# Patient Record
Sex: FEMALE | Race: WHITE | NOT HISPANIC OR LATINO | Employment: UNEMPLOYED | URBAN - METROPOLITAN AREA
[De-identification: names, ages, dates, MRNs, and addresses within clinical notes are randomized per-mention and may not be internally consistent; named-entity substitution may affect disease eponyms.]

---

## 2017-03-10 ENCOUNTER — HOSPITAL ENCOUNTER (OUTPATIENT)
Dept: RADIOLOGY | Facility: CLINIC | Age: 52
Discharge: HOME | End: 2017-03-10
Payer: COMMERCIAL

## 2017-03-10 ENCOUNTER — ALLSCRIPTS OFFICE VISIT (OUTPATIENT)
Dept: OTHER | Facility: OTHER | Age: 52
End: 2017-03-10

## 2017-03-10 DIAGNOSIS — M25.511 PAIN IN RIGHT SHOULDER: ICD-10-CM

## 2017-03-10 PROCEDURE — 73030 X-RAY EXAM OF SHOULDER: CPT

## 2017-09-25 ENCOUNTER — GENERIC CONVERSION - ENCOUNTER (OUTPATIENT)
Dept: OTHER | Facility: OTHER | Age: 52
End: 2017-09-25

## 2017-09-25 LAB
AMBIG ABBREV LP DEFAULT (HISTORICAL): NORMAL
CHOLEST SERPL-MCNC: 204 MG/DL (ref 100–199)
HDLC SERPL-MCNC: 71 MG/DL
LDLC SERPL CALC-MCNC: 113 MG/DL (ref 0–99)
TRIGL SERPL-MCNC: 100 MG/DL (ref 0–149)
VLDLC SERPL CALC-MCNC: 20 MG/DL (ref 5–40)

## 2018-01-11 NOTE — RESULT NOTES
Verified Results  (1) CBC/PLT/DIFF [id number] 10:39AM Pallavinika Hutton     Test Name Result Flag Reference   WBC 5 0 x10E3/uL  3 4-10 8   RBC 4 29 x10E6/uL  3 77-5 28   Hemoglobin 10 7 g/dL L 11 1-15 9   Hematocrit 34 9 %  34 0-46  6   MCV 81 fL  79-97   MCH 24 9 pg L 26 6-33 0   MCHC 30 7 g/dL L 31 5-35 7   RDW 16 8 % H 12 3-15 4   Platelets 067 P89C0/NX  150-379   Neutrophils 65 %     Lymphs 26 %     Monocytes 7 %     Eos 1 %     Basos 1 %     Neutrophils (Absolute) 3 2 x10E3/uL  1 4-7 0   Lymphs (Absolute) 1 3 x10E3/uL  0 7-3 1   Monocytes(Absolute) 0 4 x10E3/uL  0 1-0 9   Eos (Absolute) 0 0 x10E3/uL  0 0-0 4   Baso (Absolute) 0 1 x10E3/uL  0 0-0 2   Immature Granulocytes 0 %     Immature Grans (Abs) 0 0 x10E3/uL  0 0-0 1     (1) COMPREHENSIVE METABOLIC PANEL [id number] 74:43LQ Alicia Lew     Test Name Result Flag Reference   Glucose, Serum 93 mg/dL  65-99   BUN 12 mg/dL  6-24   Creatinine, Serum 0 66 mg/dL  0 57-1 00   eGFR If NonAfricn Am 103 mL/min/1 73  >59   eGFR If Africn Am 118 mL/min/1 73  >59   BUN/Creatinine Ratio 18  9-23   Sodium, Serum 141 mmol/L  134-144   Potassium, Serum 4 2 mmol/L  3 5-5 2   Chloride, Serum 104 mmol/L     Carbon Dioxide, Total 20 mmol/L  18-29   Calcium, Serum 8 9 mg/dL  8 7-10 2   Protein, Total, Serum 7 2 g/dL  6 0-8 5   Albumin, Serum 4 3 g/dL  3 5-5 5   Globulin, Total 2 9 g/dL  1 5-4 5   A/G Ratio 1 5  1 1-2 5   Bilirubin, Total 0 4 mg/dL  0 0-1 2   Alkaline Phosphatase, S 62 IU/L     AST (SGOT) 16 IU/L  0-40   ALT (SGPT) 11 IU/L  0-32     (1) AMYLASE [id number] 10:39AM Alicia Lew     Test Name Result Flag Reference   Amylase, Serum 26 U/L L      (1) LIPASE [id number] 10:39AM Alicia Lew     Test Name Result Flag Reference   Lipase, Serum 30 U/L  0-59     (LC) TSH reflex to T4F 26Sep2016 10:39AM Alicia Lew     Test Name Result Flag Reference   TSH 0 198 uIU/mL L 0 450-4 500       Plan  Hypothyroidism    · From  Synthroid 150 MCG Oral Tablet To Levothyroxine Sodium 137 MCG  Oral Tablet (Synthroid) TAKE ONE TABLET BY MOUTH EVERY DAY AS DIRECTED   · (1) TSH WITH FT4 REFLEX; Status:Active;  Requested for:09Nov2016;

## 2018-01-15 NOTE — PROGRESS NOTES
Assessment    1  Right shoulder pain (719 41) (M25 511)   2  Hand numbness (782 0) (R20 0)    Plan  Right shoulder pain    · * XR SHOULDER 2+ VIEW RIGHT; Status:Resulted - Requires Verification,Retrospective  By Protocol Authorization;   Done: [id number] 10:03AM   · EMG ONE EXTREMITY WITH OR W/O RELATED PARASPINAL AREAS; Status:Active -  Retrospective By Protocol Authorization; Requested for:10Mar2017;   ONE : Maximiliano Haney has an unusual mix of symptoms  We will start with an EMG of the right upper extremity to evaluate for carpal and cubital tunnel syndrome versus cervical radiculopathy  We will see her back after this study has been done and read to discuss the results and how we will proceed  Discussion/Summary  The patient was counseled regarding instructions for management, prognosis, patient and family education, impressions, importance of compliance with treatment  Chief Complaint    1  Elbow Pain   2  Hand Problem   3  Shoulder Pain    History of Present Illness    Rimma Lopez is a 46year old female who presents today for evaluation of her right upper extremity  She first notes pain about the right shoulder  Her pain is described as mostly periscapular, though she can have some anterior pain as well at times  SHe states that this is fairly constant and she can not identify any alleviation or exacerbating factors  She notes full ROM of the shoulder and good strength  She also complains of some ulnar sided elbow pain, which feels like she "hit her funny bone"  She also notes some paresthesias about the radial 3 digits of her right hand  She rarely gets any symptoms into the little and ring fingers  She denies any real pain about the neck and notes good ROM of this  All these symptoms started a couple months ago with no inciting event prior to the onset of her symptoms  Review of Systems    Constitutional: No fever, no chills, feels well, no tiredness, no recent weight gain or loss     Eyes: No complaints of eyesight problems, no red eyes  ENT: no loss of hearing, no nosebleeds, no sore throat  Cardiovascular: No complaints of chest pain, no palpitations, no leg claudication or lower extremity edema  Respiratory: no compliants of shortness of breath, no wheezing, no cough  Gastrointestinal: no complaints of abdominal pain, no constipation, no nausea or diarrhea, no vomiting, no bloody stools  Genitourinary: no complaints of dysuria, no incontinence  Musculoskeletal: as noted in HPI  Integumentary: no complaints of skin rash or lesion, no itching or dry skin, no skin wounds  Neurological: as noted in HPI  Endocrine: No complaints of muscle weakness, no feelings of weakness, no frequent urination, no excessive thirst    Psychiatric: No suicidal thoughts, no anxiety, no feelings of depression  ROS reviewed  Active Problems    1  Abdominal discomfort (789 00) (R10 9)   2  Acute bronchitis (466 0) (J20 9)   3  Acute esophagitis (530 12) (K20 9)   4  Anemia (285 9) (D64 9)   5  Chest pain (786 50) (R07 9)   6  Encounter for screening for malignant neoplasm of colon (V76 51) (Z12 11)   7  Encounter for screening mammogram for breast cancer (V76 12) (Z12 31)   8  Herniated Intervertebral Disc (722 2)   9  Hyperlipidemia (272 4) (E78 5)   10  Hypothyroidism (244 9) (E03 9)   11  Right shoulder pain (719 41) (M25 511)    Past Medical History    · History of malignant neoplasm of thyroid (V10 87) (Z85 850)   · History of thyroid disease (V12 29) (Z86 39)    The active problems and past medical history were reviewed and updated today  Surgical History    · History of Salpingectomy   · History of Thyroid Surgery Total Thyroidectomy    The surgical history was reviewed and updated today  Family History  Mother    · No pertinent family history  Father    · No pertinent family history    The family history was reviewed and updated today         Social History    · Never A Smoker   · Occasional alcohol use  The social history was reviewed and updated today  Current Meds   1  Levothyroxine Sodium 137 MCG Oral Tablet; TAKE ONE TABLET BY MOUTH EVERY   DAY AS DIRECTED; Therapy: [id number] to (53 [phone] 09 76)  Requested for: [id number]; Last   Rx:03Jan2017 Ordered   2  Levothyroxine Sodium 150 MCG Oral Tablet; TAKE 1 TABLET DAILY; Therapy: 23RED7018 to  Requested for: 97Zls3585 Recorded    The medication list was reviewed and updated today  Allergies    1  No Known Drug Allergies    Vitals   Recorded: 00IUS2135 10:09AM   Heart Rate 79   Systolic 918   Diastolic 73   Height 5 ft 4 in   Weight 125 lb    BMI Calculated 21 46   BSA Calculated 1 6     Physical Exam    Cervical Spine: Appearance: Normal  Tenderness: None  ROM: Full  Motor: Normal  Upper Extremity Motor Testing: Strength examination: wrist strength was normal bilaterally  Wrist flexion was 5/5 on the right side and 5/5 on the left side  Wrist extension was 5/5 on the right side and 5/5 on the left side  elbow strength was normal bilaterally  Biceps strength was 5/5 on the right side and 5/5 on the left side  Triceps strength was 5/5 on the right side and 5/5 on the left side  Shoulder abduction was 5/5 on the right side and 5/5 on the left side  Special Tests: negative Lt Spurling's Maneuver and negative Rt Spurling's Maneuver  Right Shoulder: Appearance: Normal  Tenderness: None  ROM: Full except as noted: Motor: Normal 5/5 forward flexion, 5/5 abduction, 5/5 internal rotation and 5/5 external rotation  Forward flexion: painless  Abduction: painless  Internal rotation: painless  External rotation: painless  Special Tests: negative Painful Arc, negative Fox test, negative Neer test, negative Drop Arm test, negative Empty Can test, negative Lift Off test, negative Belly Press test, negative Parrish's test and negative Speed's test    Right Elbow: Appearance: Normal  Tenderness: None except the cubital tunnel  ROM: Full  Motor: Normal  Special Tests: positive Tinel's at the ulnar nerve  Right Wrist: Appearance: Normal  Tenderness: None  ROM: Full  Motor: Normal  Special Tests: negative Phalen's test and negative Tinel's sign at the carpal tunnel  Constitutional - General appearance: Normal    Musculoskeletal - Gait and station: Normal  Digits and nails: Normal  Muscle strength/tone: Normal  Upper extremity compartments: Normal    Cardiovascular - Pulses: Normal  Examination of extremities for edema and/or varicosities: Normal    Lymphatic - Palpation of lymph nodes in other areas: Normal  no right axillary node enlargement and no right epitrochlear node enlargement  Skin - Skin and subcutaneous tissue: Normal    Neurologic - Sensation: Normal  Upper extremity peripheral neuro exam: Normal    Psychiatric - Orientation to person, place, and time: Normal  Mood and affect: Normal    Eyes   Conjunctiva and lids: Normal     Pupils and irises: Normal        Results/Data  * XR SHOULDER 2+ VIEW RIGHT [id number] 10:03AM Claudy Wilkesron Order Number: [id number]     Test Name Result Flag Reference   XR SHOULDER 2+ VW RIGHT (Report)     RIGHT SHOULDER     INDICATION: 51-year-old female, right shoulder pain   COMPARISON: None     VIEWS: 3     IMAGES: 3     FINDINGS:     There is no acute fracture or dislocation  No degenerative changes  No lytic or blastic lesions are seen  Soft tissues are unremarkable  IMPRESSION:     No acute osseous abnormality  Workstation performed: [id number]     Signed by:   Sharyn Gleason MD   3/10/17                               Views: complete shoulder series of the right shoulder     Findings: no fracture, no dislocation, no bony lesions and the soft tissues were normal       Attending Note  Collaborating Physician Note: Collaborating Physician: I interviewed and examined the patient, I discussed the case with the Advanced Practitioner and reviewed the note, I supervised the Advanced Practitioner and I agree with the Advanced Practitioner note  Agree with Advanced Practitioner Note Except: Kareem Mueller is the right upper extremity with a multitude of symptoms  She has signs of cubital tunnel and carpal tunnel but also has pain radiating from her neck  She does have a positive Tinel's at the elbow at the cubital tunnel  She has reasonable strength however into the hand on exam today  I would like to have any immediate further evaluate this to see the etiology of the numbness and if it is related to a cervical spine issue  She will follow up afterwards  Future Appointments    Date/Time Provider Specialty Site   04/05/2017 11:20 AM VIV Ponce  Orthopedic Surgery [address]   03/28/2017 01:00 PM VIV Sherman  Neurology [address] [place] [address]     Signatures   Electronically signed by :  Pina Young, [facility]; Mar 10 2017 10:53AM EST                       (Author)    Electronically signed by : VIV Shukla ; Mar 10 2017  5:32PM EST                       (Author)

## 2018-01-22 VITALS
WEIGHT: 125 LBS | BODY MASS INDEX: 21.34 KG/M2 | DIASTOLIC BLOOD PRESSURE: 73 MMHG | SYSTOLIC BLOOD PRESSURE: 124 MMHG | HEART RATE: 79 BPM | HEIGHT: 64 IN

## 2018-04-27 ENCOUNTER — OFFICE VISIT (OUTPATIENT)
Dept: PLASTIC SURGERY | Facility: CLINIC | Age: 53
End: 2018-04-27
Payer: COMMERCIAL

## 2018-04-27 VITALS — WEIGHT: 126 LBS | BODY MASS INDEX: 21.51 KG/M2 | HEIGHT: 64 IN

## 2018-04-27 DIAGNOSIS — H02.30 EXCESS SKIN OF EYELID, UNSPECIFIED LATERALITY: ICD-10-CM

## 2018-04-27 DIAGNOSIS — Z98.890 S/P PLASTIC SURGERY: Primary | ICD-10-CM

## 2018-04-27 PROCEDURE — COSCON: Performed by: SURGERY

## 2018-04-27 RX ORDER — CEPHALEXIN 500 MG/1
500 CAPSULE ORAL EVERY 6 HOURS SCHEDULED
Qty: 28 CAPSULE | Refills: 0 | Status: SHIPPED | OUTPATIENT
Start: 2018-04-27 | End: 2018-05-04

## 2018-04-27 NOTE — PROGRESS NOTES
Assessment/Plan:  Please see HPI  She is interested in addressing the excess skin of the upper eyelids, potentially addressing the jowls and anterior neck skin laxity as well  She and her sister-in-law MB have back-to-back appointments today  We discussed upper eyelid blepharoplasty, how it is performed, we also discussed the role of lateral/temporal brow lift to minimize the risk of compensated brow ptosis, and well we would consider brow lift, I would not insist upon it  We discussed the procedures as well as potential risks, complications and limitations including, but not limited to infection, bleeding, scarring, asymmetry, lagophthalmos, brow ptosis, nerve injury, etc   We also briefly discussed face lift/neck lift  The appropriate photographs were obtained  She has been given a face lift and eyelid surgery brochure  We reviewed photographs of reasonable results of blepharoplasty, brow lift, and face lift patients  Her questions have been answered to her satisfaction  She will consider undergoing a visual field examination, she will meet with our coordinator to discuss fees and initial dates for surgery  She knows to call should she have any additional questions  Additionally, she asked me to evaluate a suture remnant spitting from the supraumbilical region, she is status post abdominoplasty approximately 10 years ago [address]  And braided suture with multiple knots was removed from this area, there is no sign of darion infection at this point however given the risk of infection we have initiated a course of Keflex  She will let us know she has any additional problems related to this  She has also asked if we could revise the central aspect of the abdominoplasty scar to lower it some, I would like to see her again after wearing less compressive clothing as it has made it difficult to assess the scar fully today           Diagnoses and all orders for this visit:    S/P plastic surgery  -     cephalexin (KEFLEX) 500 mg capsule; Take 1 capsule (500 mg total) by mouth every 6 (six) hours for 7 days          Subjective:      Patient ID: Hanna Kuhn is a 46 y o  female  HPI    The following portions of the patient's history were reviewed and updated as appropriate: allergies, current medications, past family history, past medical history, past social history, past surgical history and problem list     Review of Systems   Constitutional: Negative for chills and fever  HENT: Negative for hearing loss  Eyes: Negative for discharge and visual disturbance  Respiratory: Negative for chest tightness and shortness of breath  Cardiovascular: Negative for chest pain and leg swelling  Genitourinary: Negative for dysuria  Musculoskeletal: Negative for gait problem  Skin: Negative for rash  Allergic/Immunologic: Negative for immunocompromised state  Neurological: Negative for seizures and headaches  Hematological: Does not bruise/bleed easily  Psychiatric/Behavioral: Negative for dysphoric mood  The patient is not nervous/anxious  Objective:      Ht 5' 4" (1 626 m)   Wt 57 2 kg (126 lb)   BMI 21 63 kg/m²          Physical Exam   Constitutional: She is oriented to person, place, and time  She appears well-nourished  HENT:   Head: Normocephalic  Eyes: Pupils are equal, round, and reactive to light  Excess skin upper eyelids   Neck: Normal range of motion  Pulmonary/Chest: Effort normal    Abdominal: Soft  Suture remnant midline, supraumbilical, see assessment/plan   Musculoskeletal: Normal range of motion  Neurological: She is alert and oriented to person, place, and time  Skin: Skin is warm and dry

## 2018-09-07 ENCOUNTER — OFFICE VISIT (OUTPATIENT)
Dept: OBGYN CLINIC | Facility: CLINIC | Age: 53
End: 2018-09-07
Payer: COMMERCIAL

## 2018-09-07 ENCOUNTER — APPOINTMENT (OUTPATIENT)
Dept: RADIOLOGY | Facility: CLINIC | Age: 53
End: 2018-09-07
Payer: COMMERCIAL

## 2018-09-07 VITALS
SYSTOLIC BLOOD PRESSURE: 145 MMHG | DIASTOLIC BLOOD PRESSURE: 88 MMHG | HEART RATE: 80 BPM | WEIGHT: 126.4 LBS | HEIGHT: 64 IN | BODY MASS INDEX: 21.58 KG/M2

## 2018-09-07 DIAGNOSIS — M25.551 PAIN IN RIGHT HIP: ICD-10-CM

## 2018-09-07 DIAGNOSIS — M25.851 FEMOROACETABULAR IMPINGEMENT OF RIGHT HIP: Primary | ICD-10-CM

## 2018-09-07 DIAGNOSIS — M25.552 PAIN IN LEFT HIP: ICD-10-CM

## 2018-09-07 PROCEDURE — 73502 X-RAY EXAM HIP UNI 2-3 VIEWS: CPT

## 2018-09-07 PROCEDURE — 99214 OFFICE O/P EST MOD 30 MIN: CPT | Performed by: ORTHOPAEDIC SURGERY

## 2018-09-07 NOTE — PROGRESS NOTES
Assessment/Plan:  1  Femoroacetabular impingement of right hip     2  Pain in right hip         Scribe Attestation    I,:   Myrna Bumpers am acting as a scribe while in the presence of the attending physician :        I,:   Dino Almaguer MD personally performed the services described in this documentation    as scribed in my presence :          Kike has signs and symptoms consistent with impingement of her hip and possible labral tear  We reviewed her x-rays in the office today and I explained that I do not appreciate any degenerative changes or bony abnormalities  I would like an MR arthrogram to evaluate for labral tear  I provided Kike with a prescription for this today in the office  We did discuss potential treatment options including physical therapy and operative intervention  If she does have a labral tear, I would like her to begin with conservative measures  I will see her back after her MRI to review results and revisit treatment options  Subjective:   Geno Bermudez is a 48 y o  female who presents today for evaluation of right hip pain that began approximately 5 years ago  She states this issue has been intermittent over the years but has become more frequent lately  She states that she experiences an intermittent, deep, throbbing, aching pain about the medial aspect of her right hip and groin that radiates distally into her medial upper leg and knee  Her symptoms increase with standing after prolonged sitting  She has been using Advil for pain which gives her relief  She denies any acute injury or trauma  She denies any paresthesia  Review of Systems   Constitutional: Negative for chills, fever and unexpected weight change  HENT: Negative for hearing loss, nosebleeds and sore throat  Eyes: Negative for pain, redness and visual disturbance  Respiratory: Negative for cough, shortness of breath and wheezing      Cardiovascular: Negative for chest pain, palpitations and leg swelling  Gastrointestinal: Negative for abdominal pain, nausea and vomiting  Endocrine: Negative for polydipsia and polyuria  Genitourinary: Negative for dysuria and hematuria  Musculoskeletal: Positive for arthralgias and myalgias  Negative for joint swelling  See HPI   Skin: Negative for rash and wound  Neurological: Negative for dizziness, numbness and headaches  Psychiatric/Behavioral: Negative for decreased concentration and suicidal ideas  The patient is not nervous/anxious  Past Medical History:   Diagnosis Date    Cancer [facility])     thyroid    Disease of thyroid gland        Past Surgical History:   Procedure Laterality Date    SALPINGECTOMY Right     THYROIDECTOMY         History reviewed  No pertinent family history  Social History     Occupational History    Not on file  Social History Main Topics    Smoking status: Never Smoker    Smokeless tobacco: Never Used    Alcohol use Yes      Comment: occasional    Drug use: No    Sexual activity: Not on file         Current Outpatient Prescriptions:     levothyroxine 150 mcg tablet, Take 150 mcg by mouth daily  , Disp: , Rfl:     Allergies   Allergen Reactions    Oxycodone      (pain medications in general)       Objective:  Vitals:    09/07/18 0914   BP: 145/88   Pulse: 80       Right Hip Exam     Tenderness   The patient is experiencing no tenderness  Range of Motion   Flexion: 140     Muscle Strength   Abduction: 5/5   Adduction: 5/5   Flexion: 5/5     Tests   MARLEE: negative    Other   Erythema: absent  Scars: absent  Sensation: normal  Pulse: present    Comments:    Hip scour (+)            Physical Exam   Constitutional: She is oriented to person, place, and time  She appears well-developed and well-nourished  HENT:   Head: Normocephalic and atraumatic  Eyes: Conjunctivae are normal    Neck: Neck supple  Cardiovascular: Intact distal pulses      Pulmonary/Chest: Effort normal    Neurological: She is alert and oriented to person, place, and time  Skin: Skin is warm and dry  Psychiatric: She has a normal mood and affect  Her behavior is normal    Vitals reviewed  I have personally reviewed pertinent films in PACS and my interpretation is as follows:  X-rays of the right hip obtained on 9/7/18 show no acute fracture, dislocation, or osseous abnormalities  There is artifact evidence of previous elective abdominal procedure

## 2018-09-18 ENCOUNTER — HOSPITAL ENCOUNTER (OUTPATIENT)
Dept: RADIOLOGY | Facility: HOSPITAL | Age: 53
Discharge: HOME | End: 2018-09-18
Attending: ORTHOPAEDIC SURGERY
Payer: COMMERCIAL

## 2018-09-18 DIAGNOSIS — M25.551 PAIN IN RIGHT HIP: ICD-10-CM

## 2018-09-18 PROCEDURE — 77002 NEEDLE LOCALIZATION BY XRAY: CPT

## 2018-09-18 PROCEDURE — 27095 INJECTION FOR HIP X-RAY: CPT

## 2018-09-18 PROCEDURE — A9585 GADOBUTROL INJECTION: HCPCS | Performed by: ORTHOPAEDIC SURGERY

## 2018-09-18 PROCEDURE — 73722 MRI JOINT OF LWR EXTR W/DYE: CPT

## 2018-09-18 RX ORDER — LIDOCAINE HYDROCHLORIDE 10 MG/ML
2 INJECTION, SOLUTION EPIDURAL; INFILTRATION; INTRACAUDAL; PERINEURAL ONCE
Status: COMPLETED | OUTPATIENT
Start: 2018-09-18 | End: 2018-09-18

## 2018-09-18 RX ADMIN — LIDOCAINE HYDROCHLORIDE 2 ML: 10 INJECTION, SOLUTION EPIDURAL; INFILTRATION; INTRACAUDAL; PERINEURAL at 14:29

## 2018-09-18 RX ADMIN — IOHEXOL 2 ML: 300 INJECTION, SOLUTION INTRAVENOUS at 14:29

## 2018-09-18 RX ADMIN — GADOBUTROL 2 ML: 604.72 INJECTION INTRAVENOUS at 14:39

## 2018-09-19 ENCOUNTER — TELEPHONE (OUTPATIENT)
Dept: OBGYN CLINIC | Facility: CLINIC | Age: 53
End: 2018-09-19

## 2018-09-19 NOTE — TELEPHONE ENCOUNTER
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

## 2018-09-21 ENCOUNTER — OFFICE VISIT (OUTPATIENT)
Dept: OBGYN CLINIC | Facility: CLINIC | Age: 53
End: 2018-09-21
Payer: COMMERCIAL

## 2018-09-21 VITALS
HEIGHT: 63 IN | WEIGHT: 128 LBS | BODY MASS INDEX: 22.68 KG/M2 | DIASTOLIC BLOOD PRESSURE: 70 MMHG | HEART RATE: 81 BPM | SYSTOLIC BLOOD PRESSURE: 130 MMHG

## 2018-09-21 DIAGNOSIS — M16.11 PRIMARY OSTEOARTHRITIS OF ONE HIP, RIGHT: Primary | ICD-10-CM

## 2018-09-21 DIAGNOSIS — M24.159 LABRAL TEAR OF HIP, DEGENERATIVE: ICD-10-CM

## 2018-09-21 DIAGNOSIS — M25.551 RIGHT HIP PAIN: ICD-10-CM

## 2018-09-21 PROCEDURE — 99214 OFFICE O/P EST MOD 30 MIN: CPT | Performed by: ORTHOPAEDIC SURGERY

## 2018-09-21 RX ORDER — MELOXICAM 7.5 MG/1
7.5 TABLET ORAL DAILY
Qty: 30 TABLET | Refills: 2 | Status: SHIPPED | OUTPATIENT
Start: 2018-09-21 | End: 2018-11-28

## 2018-09-21 NOTE — PROGRESS NOTES
Assessment/Plan:  1  Primary osteoarthritis of one hip, right  meloxicam (MOBIC) 7 5 mg tablet    Ambulatory referral to Physical Therapy   2  Labral tear of hip, degenerative  meloxicam (MOBIC) 7 5 mg tablet    Ambulatory referral to Physical Therapy   3  Right hip pain  meloxicam (MOBIC) 7 5 mg tablet    Ambulatory referral to Physical Therapy     Syed Barcenas is a very pleasant 48year old female with activity related right hip pain  After reviewing her history, exam, and imaging, she is most likely symptomatic of her early arthritis  She does not have mechanical symptoms such as locking or catching to suggest that she is symptomatic of a labral tear  We discussed these findings with her and explained that she is early in the arthritic process, and the ways that she can potentially slow the progression of the disease such as maintaining appropriate weight, avoiding repetitive impact exercises, and keeping her core and hip girdle strong  Since advil has been helping in the short term, we discontinued that and prescribed mobic to take daily with food in it's place  She understands potential side effects and reasons to discontinue  We have also referred her to formal physical therapy for stretching and strengthening  We would like to see her back in 2 months for clinical reevaluation  She will not need new imaging at that time  All questions addressed  Subjective: Right hip pain    Patient ID: Brynn Stokes is a 48 y o  female  Syed Barcenas is a very pleasant 48year old female referred to our office by our colleague, Dr Lico Peña, for our subspecialty of joint replacement and reconstruction  She has had worsening atraumatic right hip pain becoming more constant over the last several months  She denies any history of injury or surgery to the right hip   She feels the pain primarily in the groin, which has become daily up to 8-9/10 and limits her ability to perform activities of enjoyment such as walking and going to the gym  She has taken 2 advil daily, which temporarily help  She does not have issues with stairs or putting on shoes and socks  She has not tried any other treatments, but has gone for x-rays and an MR Arthrogram  She denies back pain or parasthesias in the right lower extremity  Review of Systems   Constitutional: Negative  HENT: Negative  Eyes: Negative  Respiratory: Negative  Cardiovascular: Negative  Gastrointestinal: Negative  Endocrine: Negative  Genitourinary: Negative  Musculoskeletal: Positive for arthralgias  Skin: Negative  Allergic/Immunologic: Negative  Neurological: Negative  Hematological: Negative  Psychiatric/Behavioral: Negative  Past Medical History:   Diagnosis Date    Cancer [facility])     thyroid    Disease of thyroid gland        Past Surgical History:   Procedure Laterality Date    FL INJECTION RIGHT HIP (ARTHROGRAM)  9/18/2018    SALPINGECTOMY Right     THYROIDECTOMY         History reviewed  No pertinent family history  Social History     Occupational History    Not on file  Social History Main Topics    Smoking status: Never Smoker    Smokeless tobacco: Never Used    Alcohol use Yes      Comment: occasional    Drug use: No    Sexual activity: Not on file         Current Outpatient Prescriptions:     levothyroxine 150 mcg tablet, Take 150 mcg by mouth daily  , Disp: , Rfl:     meloxicam (MOBIC) 7 5 mg tablet, Take 1 tablet (7 5 mg total) by mouth daily, Disp: 30 tablet, Rfl: 2    Allergies   Allergen Reactions    Oxycodone      (pain medications in general)       Objective:  Vitals:    09/21/18 0908   BP: 130/70   Pulse: 81       Body mass index is 22 67 kg/m²  Right Hip Exam     Tenderness   The patient is experiencing no tenderness           Range of Motion   Extension:  20 normal   Flexion:  130 normal   Internal Rotation:  20 abnormal   External Rotation:  50 normal   Abduction:  45 normal   Adduction:  25 normal Muscle Strength   Abduction: 5/5   Adduction: 5/5   Flexion: 5/5     Tests   MARLEE: positive (mild pain)  Tonja: negative    Other   Erythema: absent  Scars: absent (tummy tuck scar well healed RLQ abdomen)  Sensation: normal  Pulse: present    Comments:  Negative [place]/log roll              Physical Exam   Constitutional: She is oriented to person, place, and time  She appears well-developed and well-nourished  Body mass index is 22 67 kg/m²  HENT:   Head: Normocephalic and atraumatic  Eyes: EOM are normal    Neck: Normal range of motion  Cardiovascular: Intact distal pulses  Pulmonary/Chest: Effort normal    Musculoskeletal:   See ortho exam   Neurological: She is alert and oriented to person, place, and time  Skin: Skin is warm and dry  Psychiatric: She has a normal mood and affect  Her behavior is normal  Judgment and thought content normal        I have personally reviewed pertinent films in PACS of the x-rays and MR Arthrogram of the right hip which demonstrate mild thinning of the superior acetabular cartilage with a small marginal lateral osteophyte and a paralabral cyst  There is anterosuperior labral degeneration without significant tear  No evidence of avascular necrosis, fracture, dislocation, or loose body

## 2018-10-01 ENCOUNTER — EVALUATION (OUTPATIENT)
Dept: PHYSICAL THERAPY | Facility: CLINIC | Age: 53
End: 2018-10-01
Payer: COMMERCIAL

## 2018-10-01 DIAGNOSIS — M24.159 LABRAL TEAR OF HIP, DEGENERATIVE: ICD-10-CM

## 2018-10-01 DIAGNOSIS — M16.11 PRIMARY OSTEOARTHRITIS OF ONE HIP, RIGHT: Primary | ICD-10-CM

## 2018-10-01 DIAGNOSIS — M25.551 RIGHT HIP PAIN: ICD-10-CM

## 2018-10-01 PROCEDURE — G8979 MOBILITY GOAL STATUS: HCPCS

## 2018-10-01 PROCEDURE — 97161 PT EVAL LOW COMPLEX 20 MIN: CPT

## 2018-10-01 PROCEDURE — G8978 MOBILITY CURRENT STATUS: HCPCS

## 2018-10-01 NOTE — PROGRESS NOTES
PT Evaluation     Today's date: 10/1/2018  Patient name: Nancy Elias  : 1965  MRN: [id number]  Referring provider: Valaria Gosselin  Dx:   Encounter Diagnosis     ICD-10-CM    1  Primary osteoarthritis of one hip, right M16 11 Ambulatory referral to Physical Therapy   2  Labral tear of hip, degenerative M16 9 Ambulatory referral to Physical Therapy   3  Right hip pain M25 551 Ambulatory referral to Physical Therapy                  Assessment  Impairments: abnormal gait, abnormal muscle firing, abnormal muscle tone, abnormal or restricted ROM, abnormal movement, activity intolerance, impaired physical strength, lacks appropriate home exercise program and pain with function    Assessment details: Pt is a very pleasant 48 y o  female who presents to [place] [address] PT with R hip pain  Today, she presents with painful end ranges of motion for R hip flexion and IR, decreased R hip stability and control, high self reports of pain w/ activity, and decreased tolerance to functional activity  Functionally, she is limited in her ability to perform age appropriate recreational activity and exercise, perform prolonged standing and ambulation, and perform normal household activities  She is very motivated to improve  Pt will benefit from skilled PT to address the aforementioned deficits and limitations in an effort to maximize pain free functional mobility and overall quality of life  Progress as able with these goals in mind  Understanding of Dx/Px/POC: good   Prognosis: good    Goals  Short term goals (3 weeks):  1) Pt will improve R hip to WNL w/o pain  2) Pt will improve R hip and core MMT testing by 1/3 grade pain free  3) Pt will report pain at worse <5/10  4) Pt will initiate and progress HEP w/ special emphasis on functional core control and hip stability       Long term goals (6 weeks)  1) Pt will improve FOTO to at least 70   2) Pt will return to normal exercise routine w/ improved postural and core control, min pain  3) Pt will perform full day of household activities w/ improved postural control and hip/core mechanics and no pain  4) Pt will be independent and compliant w/ HEP in order to maximize functional benefit of skilled PT following d/c        Plan  Patient would benefit from: skilled PT  Planned modality interventions: cryotherapy and thermotherapy: hydrocollator packs  Planned therapy interventions: abdominal trunk stabilization, activity modification, joint mobilization, manual therapy, massage, motor coordination training, neuromuscular re-education, patient education, postural training, stretching, strengthening, therapeutic activities, therapeutic exercise, therapeutic training, transfer training, home exercise program, gait training, graded activity, functional ROM exercises, graded exercise, flexibility, body mechanics training, balance and balance/weight bearing training  Frequency: 2x week  Duration in visits: 12  Duration in weeks: 6  Treatment plan discussed with: patient  Plan details: POC to include: active w/u, stretching, hip and core stab, functional lifting    HEP to start: s/l hip abd and ext, single leg bridging, TrA bracing and 90/90 taps           Subjective Evaluation    History of Present Illness  Mechanism of injury: Pt has had R hip and low back pain since being pregnant 18 years ago  Pt has no pain one min, extreme pain a few minutes later  [place] has lots of pain after this  Pt takes Advil w/ good success, has been prescribed Mobic but has not had prescription filled  She notes that pain does not prevent her from doing things she needs to do, just gets very annoying  Feels that pain is deep in R hip, low back pain has started more recently  Pt is hopeful that PT will help to restore some function and help her to start exercising more regularly again   Functional status to follow:  Quality of life: good    Pain  Current pain rating: 3  At best pain ratin  At worst pain ratin  Location: low back, across entirity but more towards , deep in R hip, down R quad into R knee  Quality: throbbing and dull ache  Relieving factors: rest and change in position  Aggravating factors: standing and walking  Progression: worsening    Social Support  Steps to enter house: yes  Stairs in house: yes   Lives in: multiple-level home  Lives with: spouse and young children (2 teenage girls )    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)  Exercise history: not significant exercise hx in last in 6 months     Patient Goals  Patient goals for therapy: increased strength, decreased pain and increased motion  Patient goal: get back to exercising and doing normal activities w/o pain  Objective     Palpation     Additional Palpation Details  No significant TTP throughout B hips or low back, min/mod increase in tissue density in B piri and thoracolumbar PS     Lumbar Screen  Lumbar range of motion within normal limits      Neurological Testing     Sensation     Hip   Left Hip   Intact: light touch    Right Hip   Intact: light touch    Active Range of Motion   Left Hip   Normal active range of motion    Right Hip   Normal active range of motion    Additional Active Range of Motion Details  Pain at end range of R hip flexion and IR    Passive Range of Motion   Left Hip   Normal passive range of motion    Right Hip   Normal passive range of motion    Additional Passive Range of Motion Details  Pain at end range of R hip flexion and IR    Strength/Myotome Testing     Left Hip   Planes of Motion   Flexion: 4+  Extension: 4+  Abduction: 4+  Adduction: 4+  External rotation: 4+  Internal rotation: 4+    Isolated Muscles   Gluteus medius: 4    Right Hip   Planes of Motion   Flexion: 4  Extension: 4  Abduction: 4-  External rotation: 4  Internal rotation: 4    Isolated Muscles   Gluteus medius: 4-    Additional Strength Details  Core: 2+/5 w/ compensations at end range 3/5 testing Tests     Right Hip   Positive Tonja and scour       Additional Tests Details  Good HS flexibility noted B    Min relief w/ LAD on R     Ambulation     Ambulation: Level Surfaces     Additional Level Surfaces Ambulation Details  No significant functional deficit     Functional Assessment     Comments  Sit<>stand: no significant functional deficit    BW squat: not tested today, will assess in future       Flowsheet Rows      Most Recent Value   PT/OT G-Codes   Current Score  57   Projected Score  67   FOTO information reviewed  Yes   Assessment Type  Evaluation   G code set  Mobility: Walking & Moving Around   Mobility: Walking and Moving Around Current Status ()  CK   Mobility: Walking and Moving Around Goal Status ()  CJ          Precautions: standard     Specialty Daily Treatment Diary     Manual         piri release        LAD                                    Exercise Diary         Stationary bike pre        Cross body stretch        HS stretch        HF stretch        piri stretch        Sciatic nerve flossing        bridging        D2 hip flexion PNF        S/l hip abd and ext        TrA 90/90 taps        TrA single leg fall outs        Hip abd walking        Sit<>stands        TRX squats         glute med taps        Sumo squats        SLDL                                    Modalities        Heat pre        Ice post

## 2018-10-04 ENCOUNTER — OFFICE VISIT (OUTPATIENT)
Dept: PHYSICAL THERAPY | Facility: CLINIC | Age: 53
End: 2018-10-04
Payer: COMMERCIAL

## 2018-10-04 DIAGNOSIS — M16.11 PRIMARY OSTEOARTHRITIS OF ONE HIP, RIGHT: Primary | ICD-10-CM

## 2018-10-04 PROCEDURE — 97112 NEUROMUSCULAR REEDUCATION: CPT

## 2018-10-04 PROCEDURE — 97110 THERAPEUTIC EXERCISES: CPT

## 2018-10-04 NOTE — PROGRESS NOTES
Daily Note     Today's date: 10/4/2018  Patient name: Barbra Vasquez  : 1965  MRN: [id number]  Referring provider: Gregorio Daigle  Dx:   Encounter Diagnosis     ICD-10-CM    1  Primary osteoarthritis of one hip, right M16 11                   Subjective: Pt reports that pain is no different than at IE  Reports that pain will be deep in hip, more towards groin w/ certain activities  Objective: Pt reports more fatigue and onset of dull pain w/ hip add w/ green tband and during ecc portion of GTB abd walking w/ trailing leg  Precautions: standard     Specialty Daily Treatment Diary     Manual  10/4       piri release        LAD x2 min hold                                   Exercise Diary  2       Stationary bike pre 6 min pre lvl 4-5       Cross body stretch 3x30 sec holds        HS stretch 3x30 sec holds w/ flossing       HF stretch 3x30 sec holds        piri stretch Self review, 2x30 sec holds       Sciatic nerve flossing        bridging On peanut 2x10-15       D2 hip flexion PNF        S/l hip abd and ext        TrA 90/90 taps        TrA single leg fall outs        Hip abd walking GTB 40'x6, monster walks 40'x4       Sit<>stands W/ GTB around knees x10       TRX squats         glute med taps        Sumo squats        SLDL         Hip add w/ GTB 2x10 on R        HEP education and review x 5 mins                   Modalities 2       Heat pre no       Ice post no                     Assessment: Tolerated treatment well  Patient demonstrated fatigue post treatment and would benefit from continued PT  Pt notes min fatigue post session, no significant increase in pain  She does well w/ gentle exercises and was educated on safe exercises for home gym  Asked to start w/ program that involves hip abd/add machines, stationary bike, and other lift weights to lesser degree than she was at 6 months ago  Pain deep in R groin will be reassessed at next visit  Plan: Continue per plan of care   check adductors, single leg bridging, SLDL, core stab, tband core stab

## 2018-10-09 ENCOUNTER — OFFICE VISIT (OUTPATIENT)
Dept: PHYSICAL THERAPY | Facility: CLINIC | Age: 53
End: 2018-10-09
Payer: COMMERCIAL

## 2018-10-09 DIAGNOSIS — M16.11 PRIMARY OSTEOARTHRITIS OF ONE HIP, RIGHT: Primary | ICD-10-CM

## 2018-10-09 PROCEDURE — 97110 THERAPEUTIC EXERCISES: CPT

## 2018-10-09 PROCEDURE — 97112 NEUROMUSCULAR REEDUCATION: CPT

## 2018-10-09 NOTE — PROGRESS NOTES
Daily Note     Today's date: 10/9/2018  Patient name: Jackie Reed  : 1965  MRN: [id number]  Referring provider: Kavitha Kirby  Dx:   Encounter Diagnosis     ICD-10-CM    1  Primary osteoarthritis of one hip, right M16 11                   Subjective: Pt notes /10 pain in L hip  [place] good over the weekend  Thinks that PT has been beneficial so far  Objective: Quick fatigue w/ inverted BOSU squats but progresses to no UE support by end  Requires min cueing for pelvic symmetry w/ SLDL but corrects well  Precautions: standard     Specialty Daily Treatment Diary     Manual  10/4 10/9      piri release        LAD x2 min hold x2 min holds                                   Exercise Diary  2 3      Stationary bike pre 6 min pre lvl 4-5 no      Cross body stretch 3x30 sec holds  3x30 sec holds       HS stretch 3x30 sec holds w/ flossing 3x30 sec holds w/ flossing      HF stretch 3x30 sec holds  3x30 sec holds       piri stretch Self review, 2x30 sec holds review      Sciatic nerve flossing  Supine single leg fall outs black tband 2x10 B      bridging On peanut 2x10-15 Single leg x15 w/ 5 sec hold       D2 hip flexion PNF        S/l hip abd and ext        TrA 90/90 taps        TrA single leg fall outs        Hip abd walking GTB 40'x6, monster walks 40'x4 Black tband 80'x6, tandem walking w/ hip abd [place] 80'x2      Sit<>stands W/ GTB around knees x10 Inverted BOSU squats 3x10      TRX squats         glute med taps  W/ black tband hip abd 2x10 B      Sumo squats        SLDL  W/ table assist 2x10-15 B       Hip add w/ GTB 2x10 on R        HEP education and review x 5 mins Reviewed         BOSU lateral lunges 2x10 B          Modalities 2 3      Heat pre no no      Ice post no no                  Assessment: Tolerated treatment well  Patient demonstrated fatigue post treatment and would benefit from continued PT  Fatigue but no pain by end of session   Only exercise that was min aggravating was tandem walking w/ hip abd circles, reps held to a minimum as a result  Notes fatigue but no pain by end of session and is appropriate for higher level progressions as able  Plan: Continue per plan of care   TrA taps, tband hip abd, planks

## 2018-10-11 ENCOUNTER — OFFICE VISIT (OUTPATIENT)
Dept: PHYSICAL THERAPY | Facility: CLINIC | Age: 53
End: 2018-10-11
Payer: COMMERCIAL

## 2018-10-11 DIAGNOSIS — M16.11 PRIMARY OSTEOARTHRITIS OF ONE HIP, RIGHT: Primary | ICD-10-CM

## 2018-10-11 PROCEDURE — 97112 NEUROMUSCULAR REEDUCATION: CPT

## 2018-10-11 PROCEDURE — 97110 THERAPEUTIC EXERCISES: CPT

## 2018-10-11 NOTE — PROGRESS NOTES
Daily Note     Today's date: 10/11/2018  Patient name: Florencio Napoles  : 1965  MRN: [id number]  Referring provider: Brody Key  Dx:   Encounter Diagnosis     ICD-10-CM    1  Primary osteoarthritis of one hip, right M16 11                   Subjective: Pt reports soreness following last session in glutes and hips  Didn't think that workload was too intense  Notes that she is wondering if her low back is contributing to sx  Objective: Requires cueing but corrects well for initial form deficits w/ tband core stab  Fatigues but has no pain throughout         Precautions: standard     Specialty Daily Treatment Diary     Manual  10/4 10/9 10/11     piri release        LAD x2 min hold x2 min holds  x2 min hold        R sided lumbar UPAs L2-4 grade III x 2-3 mins                         Exercise Diary  2 3 4     Stationary bike pre 6 min pre lvl 4-5 no no     Cross body stretch 3x30 sec holds  3x30 sec holds  3x30 sec holds      HS stretch 3x30 sec holds w/ flossing 3x30 sec holds w/ flossing 3x30 sec holds w/ flossing     HF stretch 3x30 sec holds  3x30 sec holds       piri stretch Self review, 2x30 sec holds review piri stretch 3x30 sec B      Sciatic nerve flossing  Supine single leg fall outs black tband 2x10 B 2x10 B      bridging On peanut 2x10-15 Single leg x15 w/ 5 sec hold  Black tband around knees 2x10     D2 hip flexion PNF        S/l hip abd and ext   S/l open books w/ breath control 2x10 B     TrA 90/90 taps   2x10, single leg ext taps 2x10, core rotations 2x10 B     TrA single leg fall outs   Blue tband pallof press 2x10, rotations 2x10, lateral walkouts x 10 B, posterior walk outs x 10, progression review x 5 min     Hip abd walking GTB 40'x6, monster walks 40'x4 Black tband 80'x6, tandem walking w/ hip abd [place] 80'x2 Review      Sit<>stands W/ GTB around knees x10 Inverted BOSU squats 3x10      TRX squats         glute med taps  W/ black tband hip abd 2x10 B      Sumo squats SLDL  W/ table assist 2x10-15 B       Hip add w/ GTB 2x10 on R        HEP education and review x 5 mins Reviewed         BOSU lateral lunges 2x10 B          Modalities 2 3 4     Heat pre no no no     Ice post no no no                   Assessment: Tolerated treatment well  Patient demonstrated fatigue post treatment and would benefit from continued PT  Does well w/ exercises aimed at core stability and improving core/hip control and coordination  Requires initial cueing for proper activation but corrects well and maintains correction throughout  Appropriate for continued progressions  Was given blue and for home and will continue w/ exercises on own  R side lumbar spine, especiially on R, does appear to be hypomobile  Techniques will be incorporated to address this in future  Plan: Continue per plan of care   planking, side stretch, prayer stretch

## 2018-10-16 ENCOUNTER — OFFICE VISIT (OUTPATIENT)
Dept: PHYSICAL THERAPY | Facility: CLINIC | Age: 53
End: 2018-10-16
Payer: COMMERCIAL

## 2018-10-16 DIAGNOSIS — M16.11 PRIMARY OSTEOARTHRITIS OF ONE HIP, RIGHT: Primary | ICD-10-CM

## 2018-10-16 PROCEDURE — 97110 THERAPEUTIC EXERCISES: CPT

## 2018-10-16 PROCEDURE — 97112 NEUROMUSCULAR REEDUCATION: CPT

## 2018-10-16 NOTE — PROGRESS NOTES
Daily Note     Today's date: 10/16/2018  Patient name: Ok Scheuermann  : 1965  MRN: [id number]  Referring provider: Micaela Johnson  Dx:   Encounter Diagnosis     ICD-10-CM    1   Primary osteoarthritis of one hip, right M16 11        Start Time: 0850  Stop Time: 0930  Total time in clinic (min): 40 minutes    Subjective: Pt states her right hip & LB all feel related; woke up today with "achiness" throughout; pt states she has difficulty transferring in/out of car      Objective: See treatment diary below    Precautions: standard     Specialty Daily Treatment Diary     Manual  10/4 10/9 10/11 10-16    piri release        LAD x2 min hold x2 min holds  x2 min hold        R sided lumbar UPAs L2-4 grade III x 2-3 mins Performed by primary PT                         Exercise Diary  2 3 4 5    Stationary bike pre 6 min pre lvl 4-5 no no no    Cross body stretch 3x30 sec holds  3x30 sec holds  3x30 sec holds  3 x 30 sec holds     HS stretch 3x30 sec holds w/ flossing 3x30 sec holds w/ flossing 3x30 sec holds w/ flossing 3 x 30 sec holds w flossing     HF stretch 3x30 sec holds  3x30 sec holds       piri stretch Self review, 2x30 sec holds review piri stretch 3x30 sec B  piri stretch   3 x 30 sec B     Sciatic nerve flossing  Supine single leg fall outs black tband 2x10 B 2x10 B  Supine single leg fall outs   Black tband   2 x 10 B     bridging On peanut 2x10-15 Single leg x15 w/ 5 sec hold  Black tband around knees 2x10 BTB around knees for bridging 2 x 10     D2 hip flexion PNF        S/l hip abd and ext   S/l open books w/ breath control 2x10 B SI open books - NV     TrA 90/90 taps   2x10, single leg ext taps 2x10, core rotations 2x10 B 2 x 10, single leg ext taps 2x10, core rotaions 2 x10 B     TrA single leg fall outs   Blue tband pallof press 2x10, rotations 2x10, lateral walkouts x 10 B, posterior walk outs x 10, progression review x 5 min BTB pallof press 2 x 10, rotations 2 x 10, lateral walkouts x 10, post walk outs x 10    Hip abd walking GTB 40'x6, monster walks 40'x4 Black tband 80'x6, tandem walking w/ hip abd [place] 80'x2 Review      Sit<>stands W/ GTB around knees x10 Inverted BOSU squats 3x10  Inverted BOSU squats 5 x 10     TRX squats         glute med taps  W/ black tband hip abd 2x10 B      Sumo squats        SLDL  W/ table assist 2x10-15 B       Hip add w/ GTB 2x10 on R        HEP education and review x 5 mins Reviewed         BOSU lateral lunges 2x10 B          Modalities 2 3 4 5    Heat pre no no no no    Ice post no no no No                       Assessment: Tolerated treatment well  Patient would benefit from continued PT; pt with good tolerance to core strengthening in stance & supine; tolerated well squats ( significant amt )  on inverted BOSU without LBP/hip pain; pt reports good tolerance to LS mobs       Plan: Continue per plan of care

## 2018-10-18 ENCOUNTER — OFFICE VISIT (OUTPATIENT)
Dept: PHYSICAL THERAPY | Facility: CLINIC | Age: 53
End: 2018-10-18
Payer: COMMERCIAL

## 2018-10-18 DIAGNOSIS — M16.11 PRIMARY OSTEOARTHRITIS OF ONE HIP, RIGHT: Primary | ICD-10-CM

## 2018-10-18 PROCEDURE — 97110 THERAPEUTIC EXERCISES: CPT

## 2018-10-18 PROCEDURE — 97140 MANUAL THERAPY 1/> REGIONS: CPT

## 2018-10-18 PROCEDURE — 97112 NEUROMUSCULAR REEDUCATION: CPT

## 2018-10-18 NOTE — PROGRESS NOTES
Daily Note     Today's date: 10/18/2018  Patient name: Ok Scheuermann  : 1965  MRN: [id number]  Referring provider: Micaela Johnson  Dx:   Encounter Diagnosis     ICD-10-CM    1  Primary osteoarthritis of one hip, right M16 11                   Subjective: Pt reports 2/10 pain in R anterior thigh to start session  Starting to think issue is more w/ low back as opposed to hip  Groin pain is intermittent but she is noticing more pain down leg as opposed to groin as of late  Objective: Requires cueing for TrA contraction and hip symmetry during quad alt arm and leg, mod correction  W/ plank, requires cueing to prevent hip drop but corrects well         Precautions: standard     Specialty Daily Treatment Diary     Manual  10/4 10/9 10/11 10-16 10/18   piri release        LAD x2 min hold x2 min holds  x2 min hold        R sided lumbar UPAs L2-4 grade III x 2-3 mins Performed by primary PT  x5-6 mins B UPAs, R sided UPAs Lspine only         Manual lumbar traction 2x2 min holds               Exercise Diary  2 3 4 5 6   Stationary bike pre 6 min pre lvl 4-5 no no no no   Cross body stretch 3x30 sec holds  3x30 sec holds  3x30 sec holds  3 x 30 sec holds  3x30 sec holds B   HS stretch 3x30 sec holds w/ flossing 3x30 sec holds w/ flossing 3x30 sec holds w/ flossing 3 x 30 sec holds w flossing  3x30 sec holds B    HF stretch 3x30 sec holds  3x30 sec holds    No    piri stretch Self review, 2x30 sec holds review piri stretch 3x30 sec B  piri stretch   3 x 30 sec B  Manual 3x30 sec holds    Sciatic nerve flossing  Supine single leg fall outs black tband 2x10 B 2x10 B  Supine single leg fall outs   Black tband   2 x 10 B  review   bridging On peanut 2x10-15 Single leg x15 w/ 5 sec hold  Black tband around knees 2x10 BTB around knees for bridging 2 x 10  Review    D2 hip flexion PNF        S/l hip abd and ext   S/l open books w/ breath control 2x10 B SI open books - NV     TrA 90/90 taps   2x10, single leg ext taps 2x10, core rotations 2x10 B 2 x 10, single leg ext taps 2x10, core rotaions 2 x10 B  Double leg ext taps 2x10-15   TrA single leg fall outs   Blue tband pallof press 2x10, rotations 2x10, lateral walkouts x 10 B, posterior walk outs x 10, progression review x 5 min BTB pallof press 2 x 10, rotations 2 x 10, lateral walkouts x 10, post walk outs x 10 Review    Hip abd walking GTB 40'x6, monster walks 40'x4 Black tband 80'x6, tandem walking w/ hip abd [place] 80'x2 Review      Sit<>stands W/ GTB around knees x10 Inverted BOSU squats 3x10  Inverted BOSU squats 5 x 10  Sumo squats 35-45# 2x10-15 total    TRX squats         glute med taps  W/ black tband hip abd 2x10 B   Planks 2x30 sec, side planks x 20-30 sec   Sumo squats     Quad alt arm and leg 2x10 B    SLDL  W/ table assist 2x10-15 B       Hip add w/ GTB 2x10 on R    HEP progression education, education on gym program    HEP education and review x 5 mins Reviewed         BOSU lateral lunges 2x10 B          Modalities 2 3 4 5 6   Heat pre no no no no no   Ice post no no no No  no               Assessment: Tolerated treatment well  Patient demonstrated fatigue post treatment and would benefit from continued PT  Pt does well w/ progressions today  Noted to have slight hypomobility through Lspine R>L that improves post manual techniques  She was encouraged to begin w/ home gym program, starting slow w/ light cardio, core work, and stretching before progressing to higher level activities  Her sx do appear to be a combination of low back dysfunction and R hip dysfunction  She does well w/ stretching and manual techniques and will be progressed to higher intensity exercise as able at next session  She is scheduled for two visits next week, will discuss treatment plan going forward at next session  Plan: Continue per plan of care   dead bugs and quad alt arm and leg w/ resistance, plank progressions,

## 2018-10-23 ENCOUNTER — OFFICE VISIT (OUTPATIENT)
Dept: PHYSICAL THERAPY | Facility: CLINIC | Age: 53
End: 2018-10-23
Payer: COMMERCIAL

## 2018-10-23 DIAGNOSIS — M16.11 PRIMARY OSTEOARTHRITIS OF ONE HIP, RIGHT: Primary | ICD-10-CM

## 2018-10-23 PROCEDURE — 97110 THERAPEUTIC EXERCISES: CPT

## 2018-10-23 PROCEDURE — 97140 MANUAL THERAPY 1/> REGIONS: CPT

## 2018-10-23 NOTE — PROGRESS NOTES
Daily Note     Today's date: 10/23/2018  Patient name: Marciano Shahid  : 1965  MRN: [id number]  Referring provider: Gala Griffin  Dx:   Encounter Diagnosis     ICD-10-CM    1  Primary osteoarthritis of one hip, right M16 11                   Subjective: Pt reports 1-2/10 hip pain in R hip today  Reports that the duration and intensity is not as bad as it was  Reports that she went to [employer] for workout yesterday w/ good success  Sprints at end were painful but rest was okay  Feels that she is improving  Barring setback thinks she will be okay for d/c at next session        Objective: Demonstrates independence and safe form w/ all self strap stretching       Precautions: standard     Specialty Daily Treatment Diary     Manual  10/23  10/11 10-16 10/18   piri release        LAD   x2 min hold      R sided and B lumbar UPAs L2-4 grade III x 5-6 mins  R sided lumbar UPAs L2-4 grade III x 2-3 mins Performed by primary PT  x5-6 mins B UPAs, R sided UPAs Lspine only     LAD on B LE x2-3 min holds    Manual lumbar traction 2x2 min holds               Exercise Diary  7  4 5 6   Stationary bike pre no  no no no   Cross body stretch 3x30 sec holds B  3x30 sec holds  3 x 30 sec holds  3x30 sec holds B   HS stretch 3x30 sec holds B   3x30 sec holds w/ flossing 3 x 30 sec holds w flossing  3x30 sec holds B    HF stretch Self w/ strap 2x30 sec holds     No    piri stretch Manual 3x30 sec holds  piri stretch 3x30 sec B  piri stretch   3 x 30 sec B  Manual 3x30 sec holds    Sciatic nerve flossing   2x10 B  Supine single leg fall outs   Black tband   2 x 10 B  review   bridging Education and practice in self stretching w/ strap x 5-7 mins   Black tband around knees 2x10 BTB around knees for bridging 2 x 10  Review    D2 hip flexion PNF        S/l hip abd and ext   S/l open books w/ breath control 2x10 B SI open books - NV     TrA 90/90 taps   2x10, single leg ext taps 2x10, core rotations 2x10 B 2 x 10, single leg ext taps 2x10, core rotaions 2 x10 B  Double leg ext taps 2x10-15   TrA single leg fall outs   Blue tband pallof press 2x10, rotations 2x10, lateral walkouts x 10 B, posterior walk outs x 10, progression review x 5 min BTB pallof press 2 x 10, rotations 2 x 10, lateral walkouts x 10, post walk outs x 10 Review    Hip abd walking   Review      Sit<>stands    Inverted BOSU squats 5 x 10  Sumo squats 35-45# 2x10-15 total    TRX squats         glute med taps     Planks 2x30 sec, side planks x 20-30 sec   Sumo squats     Quad alt arm and leg 2x10 B    SLDL         Review of HEP and proper progressions     HEP progression education, education on gym program                       Modalities 7  4 5 6   Heat pre no  no no no   Ice post no  no No  no               Assessment: Tolerated treatment well  Patient demonstrated fatigue post treatment and would benefit from continued PT  Does well w/ manual and self stretching, shows good response to instruction in self stretching and was given handout depicting which stretches to continue with (see scanned document)  Possible d/c next visit pending results of next workout  She is aware of progressions that are safe and effective and will most likely benefit from HEP and gym program barring any setback  Plan: Continue per plan of care  reinforce HEP

## 2018-10-25 ENCOUNTER — OFFICE VISIT (OUTPATIENT)
Dept: PHYSICAL THERAPY | Facility: CLINIC | Age: 53
End: 2018-10-25
Payer: COMMERCIAL

## 2018-10-25 DIAGNOSIS — M16.11 PRIMARY OSTEOARTHRITIS OF ONE HIP, RIGHT: Primary | ICD-10-CM

## 2018-10-25 PROCEDURE — G8979 MOBILITY GOAL STATUS: HCPCS

## 2018-10-25 PROCEDURE — G8980 MOBILITY D/C STATUS: HCPCS

## 2018-10-25 PROCEDURE — 97112 NEUROMUSCULAR REEDUCATION: CPT

## 2018-10-25 PROCEDURE — 97110 THERAPEUTIC EXERCISES: CPT

## 2018-10-25 NOTE — PROGRESS NOTES
Physical Therapy Discharge Report    Today's date: 10/25/2018  Patient name: Barbra Vasquez  : 1965  MRN: [id number]  Referring provider: Gregorio Daigle  Dx:   Encounter Diagnosis     ICD-10-CM    1  Primary osteoarthritis of one hip, right M16 11                    There is no problem list on file for this patient  Past Medical History:   Diagnosis Date    Cancer [facility])     thyroid    Disease of thyroid gland         Past Surgical History:   Procedure Laterality Date    FL INJECTION RIGHT HIP (ARTHROGRAM)  2018    SALPINGECTOMY Right     THYROIDECTOMY         Assessment:  Most recent visit: see below  Exercises performed:  HEP reviewed, see below     Goals and Progress:  Short term goals (3 weeks):  1) Pt will improve R hip to WNL w/o pain  - achieved   2) Pt will improve R hip and core MMT testing by 1/3 grade pain free  - achieved   3) Pt will report pain at worse <5/10  - achieved   4) Pt will initiate and progress HEP w/ special emphasis on functional core control and hip stability  - achieved     Long term goals (6 weeks)  1) Pt will improve FOTO to at least 70  - progressed   2) Pt will return to normal exercise routine w/ improved postural and core control, min pain  - achieved   3) Pt will perform full day of household activities w/ improved postural control and hip/core mechanics and no pain  - progressed/achieved   4) Pt will be independent and compliant w/ HEP in order to maximize functional benefit of skilled PT following d/c  - achieved     Plan:  Plan: d/c to HEP     Additional Discharge Considerations:  Pt is now working out at [facility], will continue HEP there    Subjective:  Patient's response to therapy: Pt reports that PT has been very beneficial  Feels that she has a better handle on what causes pain  Is back to working out independently w/ good success  Feels she can manage remaining sx on own  Will call w/ any future issues   Updated functional status to follow: Objective:    Pain  Current pain ratin  At best pain ratin  At worst pain rating: 3-4  Location: low back, across entirity but more towards , deep in R hip, down R quad into R knee  Quality: throbbing and dull ache  Relieving factors: rest and change in position  Aggravating factors: standing and walking  Progression: worsening    Social Support  Steps to enter house: yes  Stairs in house: yes   Lives in: multiple-level home  Lives with: spouse and young children (2 teenage girls )    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)  Exercise history: not significant exercise hx in last in 6 months     Patient Goals  Patient goals for therapy: increased strength, decreased pain and increased motion  Patient goal: get back to exercising and doing normal activities w/o pain  Objective     Palpation     Additional Palpation Details  No significant TTP throughout B hips or low back, min/mod increase in tissue density in B piri and thoracolumbar PS     Lumbar Screen  Lumbar range of motion within normal limits      Neurological Testing     Sensation     Hip   Left Hip   Intact: light touch    Right Hip   Intact: light touch    Active Range of Motion   Left Hip   Normal active range of motion    Right Hip   Normal active range of motion    Additional Active Range of Motion Details  No significant pain noted today at end ranges    Passive Range of Motion   Left Hip   Normal passive range of motion    Right Hip   Normal passive range of motion    Additional Passive Range of Motion Details  No significant pain noted today     Strength/Myotome Testing     Left Hip   Planes of Motion   Flexion: 4+  Extension: 4+  Abduction: 4+  Adduction: 4+  External rotation: 4+  Internal rotation: 4+    Isolated Muscles   Gluteus medius: 4+    Right Hip   Planes of Motion   Flexion: 4+  Extension: 4+  Abduction: 4  External rotation: 4+  Internal rotation: 4+    Isolated Muscles   Gluteus medius: 4    Additional Strength Details  Core: 3+-4/5 w/ compensations at end range 4/5 testing     Tests     Right Hip   Special testing not performed today, no pain noted     Additional Tests Details  Good HS flexibility noted B    Good relief w/ LAD on R     Ambulation     Ambulation: Level Surfaces     Additional Level Surfaces Ambulation Details  No significant functional deficit     Functional Assessment     Comments  Sit<>stand: no significant functional deficit    BW squat: no deficit noted       Precautions: standard     Specialty Daily Treatment Diary     Manual  10/23 10/25  10-16 10/18   piri release        LAD         R sided and B lumbar UPAs L2-4 grade III x 5-6 mins x2 mins  Performed by primary PT  x5-6 mins B UPAs, R sided UPAs Lspine only     LAD on B LE x2-3 min holds x2 min hold   Manual lumbar traction 2x2 min holds               Exercise Diary  7 8  5 6   Stationary bike pre no No   no no   Cross body stretch 3x30 sec holds B 3x30-45 sec holds B  3 x 30 sec holds  3x30 sec holds B   HS stretch 3x30 sec holds B  3x30-45 sec holds B  3 x 30 sec holds w flossing  3x30 sec holds B    HF stretch Self w/ strap 2x30 sec holds  Reviewed    No    piri stretch Manual 3x30 sec holds 3x30-45 sec holds B  piri stretch   3 x 30 sec B  Manual 3x30 sec holds    Sciatic nerve flossing    Supine single leg fall outs   Black tband   2 x 10 B  review   bridging Education and practice in self stretching w/ strap x 5-7 mins  Education and practice in self foam rolling x 6-7 mins   BTB around knees for bridging 2 x 10  Review    D2 hip flexion PNF        S/l hip abd and ext    SI open books - NV     TrA 90/90 taps    2 x 10, single leg ext taps 2x10, core rotaions 2 x10 B  Double leg ext taps 2x10-15   TrA single leg fall outs    BTB pallof press 2 x 10, rotations 2 x 10, lateral walkouts x 10, post walk outs x 10 Review    Hip abd walking        Sit<>stands    Inverted BOSU squats 5 x 10  Sumo squats 35-45# 2x10-15 total    TRX squats glute med taps     Planks 2x30 sec, side planks x 20-30 sec   Sumo squats     Quad alt arm and leg 2x10 B    SLDL         Review of HEP and proper progressions  Review of progressions x 5 mins    HEP progression education, education on gym program                       Modalities 7 8  5 6   Heat pre no no  no no   Ice post no no  No  no                 Assessment: Tolerated treatment well  Patient has reached or made significant progress towards all goals set at IE  She is [facility] and is appropriate for d/c to solely this program at this time  She shows excellent understanding of possible sources of pain, stretches to perform on own, and foam rolling techniques  She has been a pleasure to work with and will follow up w/ any questions in the future         Plan: D/C to HEP

## 2018-11-28 ENCOUNTER — OFFICE VISIT (OUTPATIENT)
Dept: FAMILY MEDICINE CLINIC | Facility: CLINIC | Age: 53
End: 2018-11-28
Payer: COMMERCIAL

## 2018-11-28 VITALS
DIASTOLIC BLOOD PRESSURE: 82 MMHG | TEMPERATURE: 97.6 F | BODY MASS INDEX: 22.43 KG/M2 | SYSTOLIC BLOOD PRESSURE: 142 MMHG | RESPIRATION RATE: 16 BRPM | WEIGHT: 126.6 LBS | HEART RATE: 76 BPM | HEIGHT: 63 IN

## 2018-11-28 DIAGNOSIS — E78.5 HYPERLIPIDEMIA, UNSPECIFIED HYPERLIPIDEMIA TYPE: ICD-10-CM

## 2018-11-28 DIAGNOSIS — Z00.00 ROUTINE HEALTH MAINTENANCE: Primary | ICD-10-CM

## 2018-11-28 DIAGNOSIS — Z23 NEED FOR IMMUNIZATION AGAINST INFLUENZA: ICD-10-CM

## 2018-11-28 DIAGNOSIS — Z11.59 NEED FOR HEPATITIS C SCREENING TEST: ICD-10-CM

## 2018-11-28 DIAGNOSIS — E03.9 HYPOTHYROIDISM, UNSPECIFIED TYPE: ICD-10-CM

## 2018-11-28 DIAGNOSIS — Z23 NEED FOR DIPHTHERIA-TETANUS-PERTUSSIS (TDAP) VACCINE: ICD-10-CM

## 2018-11-28 DIAGNOSIS — D64.9 ANEMIA, UNSPECIFIED TYPE: ICD-10-CM

## 2018-11-28 DIAGNOSIS — R53.83 OTHER FATIGUE: ICD-10-CM

## 2018-11-28 PROCEDURE — 90715 TDAP VACCINE 7 YRS/> IM: CPT

## 2018-11-28 PROCEDURE — 99396 PREV VISIT EST AGE 40-64: CPT | Performed by: NURSE PRACTITIONER

## 2018-11-28 PROCEDURE — 90471 IMMUNIZATION ADMIN: CPT

## 2018-11-28 PROCEDURE — 90682 RIV4 VACC RECOMBINANT DNA IM: CPT

## 2018-11-28 PROCEDURE — 90472 IMMUNIZATION ADMIN EACH ADD: CPT

## 2018-11-28 RX ORDER — LEVOTHYROXINE SODIUM 137 UG/1
TABLET ORAL
Refills: 4 | COMMUNITY
Start: 2018-09-19 | End: 2019-11-25 | Stop reason: SDUPTHER

## 2018-11-28 NOTE — PROGRESS NOTES
FAMILY PRACTICE HEALTH MAINTENANCE OFFICE VISIT  [facility]    NAME: Talia Moore  AGE: 48 y o  SEX: female  : 1965     DATE: 2018    Assessment and Plan     Problem List Items Addressed This Visit        Endocrine    Postoperative hypothyroidism    Relevant Medications    levothyroxine 137 mcg tablet       Other    Anemia    Hyperlipidemia    Relevant Orders    Lipid panel      Other Visit Diagnoses     Routine health maintenance    -  Primary    Other fatigue        Relevant Orders    CBC and differential    Comprehensive metabolic panel    EBV acute panel    Need for hepatitis C screening test        Relevant Orders    Hepatitis C antibody    Need for immunization against influenza        Relevant Orders    influenza vaccine, [id number], quadrivalent, recombinant, PF, 0 5 mL, for patients 18 yr+ (FLUBLOK) (Completed)    Need for diphtheria-tetanus-pertussis (Tdap) vaccine        Relevant Orders    TDAP VACCINE GREATER THAN OR EQUAL TO 6YO IM (Completed)            · Patient Counseling:   · Nutrition: Stressed importance of a well balanced diet, moderation of sodium/saturated fat, caloric balance and sufficient intake of fiber  · Exercise: Stressed the importance of regular exercise with a goal of 150 minutes per week  · Dental Health: Discussed daily flossing and brushing and regular dental visits     · Immunizations reviewed: Flu and Adacel given  · Discussed benefits of screening  BMI Counseling: Body mass index is 22 43 kg/m²  Discussed with patient's BMI with her  The BMI is in the acceptable range  No Follow-up on file  Chief Complaint     Chief Complaint   Patient presents with    Physical Exam     [facility]    Shortness of Breath    [facility]       History of Present Illness     Here today for CPE  For the past week, she has been fatigued and short of breath  No exertional symptoms     Works out at [employer] and has no symptoms  Endocrinologist at [facility] for management of postsurgical hypothyroidism s/p thyroid cancer  Pap and mammo up to date  Colonoscopy 2015 with repeat due in 5 years  Well Adult Physical   Patient here for a comprehensive physical exam       Diet and Physical Activity  Diet: well balanced diet  Weight concerns: None, patient's BMI is between 18 5-24 9  Exercise: frequently      Depression Screen  PHQ-9 Depression Screening    PHQ-9:    Frequency of the following problems over the past two weeks:               General Health  Hearing: Normal:  bilateral  Vision: no vision problems and most recent eye exam <1 year  Dental: regular dental visits and brushes teeth twice daily    Reproductive Health  Menarcheal  Up to date with gyn care      The following portions of the patient's history were reviewed and updated as appropriate: allergies, current medications, past family history, past medical history, past social history, past surgical history and problem list     Review of Systems     Review of Systems   Constitutional: Negative  Respiratory: Positive for shortness of breath  Cardiovascular: Negative for chest pain, palpitations and leg swelling         Past Medical History     Past Medical History:   Diagnosis Date    Cancer ([address] )     thyroid    Disease of thyroid gland        Past Surgical History     Past Surgical History:   Procedure Laterality Date    FL INJECTION RIGHT HIP (ARTHROGRAM)  9/18/2018    SALPINGECTOMY Right     THYROIDECTOMY         Social History     Social History     Social History    Marital status: /Civil Union     Spouse name: N/A    Number of children: N/A    Years of education: N/A     Social History Main Topics    Smoking status: Never Smoker    Smokeless tobacco: Never Used    Alcohol use Yes      Comment: occasional    Drug use: No    Sexual activity: Not Asked     Other Topics Concern    None     Social History Narrative    None Family History     No family history on file  Current Medications       Current Outpatient Prescriptions:     levothyroxine 137 mcg tablet, , Disp: , Rfl: 4     Allergies     Allergies   Allergen Reactions    Oxycodone      (pain medications in general)       Objective     /82   Pulse 76   Temp 97 6 °F (36 4 °C)   Resp 16   Ht 5' 3" (1 6 m)   Wt 57 4 kg (126 lb 9 6 oz)   BMI 22 43 kg/m²      Physical Exam   Constitutional: She is oriented to person, place, and time  She appears well-developed and well-nourished  HENT:   Head: Normocephalic  Right Ear: External ear normal    Left Ear: External ear normal    Nose: Nose normal    Mouth/Throat: Oropharynx is clear and moist    Eyes: Pupils are equal, round, and reactive to light  Conjunctivae and EOM are normal    Neck: Neck supple  Thyromegaly: thyroid surgically absent  Cardiovascular: Normal rate, regular rhythm, normal heart sounds and intact distal pulses  No murmur heard  Pulmonary/Chest: Effort normal and breath sounds normal    Abdominal: Soft  Bowel sounds are normal  She exhibits no distension  Musculoskeletal: Normal range of motion  She exhibits no edema  Lymphadenopathy:     She has no cervical adenopathy  Neurological: She is alert and oriented to person, place, and time  She has normal reflexes  Skin: Skin is warm, dry and intact  Psychiatric: She has a normal mood and affect  Nursing note and vitals reviewed          No exam data present    Health Maintenance     Health Maintenance   Topic Date Due    Hepatitis C Screening  1965    CRC Screening: Colonoscopy  1965    Pneumococcal PPSV23 Highest Risk Adult (1 of 3 - PCV13) 05/27/1984    MAMMOGRAM  10/11/2017    PT PLAN OF CARE  10/31/2018    Depression Screening PHQ  10/01/2019    DTaP,Tdap,and Td Vaccines (2 - Td) 11/28/2028    INFLUENZA VACCINE  Completed     Immunization History   Administered Date(s) Administered    Influenza, recombinant, quadrivalent,injectable, preservative free 11/28/2018    Tdap 11/28/2018       [facility], [address], [place]

## 2018-11-30 LAB
ALBUMIN SERPL-MCNC: 4.3 G/DL (ref 3.5–5.5)
ALBUMIN/GLOB SERPL: 1.7 {RATIO} (ref 1.2–2.2)
ALP SERPL-CCNC: 64 IU/L (ref 39–117)
ALT SERPL-CCNC: 13 IU/L (ref 0–32)
AST SERPL-CCNC: 13 IU/L (ref 0–40)
BASOPHILS # BLD AUTO: 0.1 X10E3/UL (ref 0–0.2)
BASOPHILS NFR BLD AUTO: 1 %
BILIRUB SERPL-MCNC: 0.2 MG/DL (ref 0–1.2)
BUN SERPL-MCNC: 13 MG/DL (ref 6–24)
BUN/CREAT SERPL: 16 (ref 9–23)
CALCIUM SERPL-MCNC: 9 MG/DL (ref 8.7–10.2)
CHLORIDE SERPL-SCNC: 103 MMOL/L (ref 96–106)
CHOLEST SERPL-MCNC: 235 MG/DL (ref 100–199)
CHOLEST/HDLC SERPL: 3.1 RATIO (ref 0–4.4)
CO2 SERPL-SCNC: 20 MMOL/L (ref 20–29)
CREAT SERPL-MCNC: 0.8 MG/DL (ref 0.57–1)
EBV EA IGG SER-ACNC: 14.7 U/ML (ref 0–8.9)
EBV NA IGG SER IA-ACNC: 371 U/ML (ref 0–17.9)
EBV PATRN SPEC IB-IMP: ABNORMAL
EBV VCA IGG SER IA-ACNC: 206 U/ML (ref 0–17.9)
EBV VCA IGM SER IA-ACNC: <36 U/ML (ref 0–35.9)
EOSINOPHIL # BLD AUTO: 0.1 X10E3/UL (ref 0–0.4)
EOSINOPHIL NFR BLD AUTO: 1 %
ERYTHROCYTE [DISTWIDTH] IN BLOOD BY AUTOMATED COUNT: 17.9 % (ref 12.3–15.4)
GLOBULIN SER-MCNC: 2.6 G/DL (ref 1.5–4.5)
GLUCOSE SERPL-MCNC: 93 MG/DL (ref 65–99)
HCT VFR BLD AUTO: 32.1 % (ref 34–46.6)
HCV AB S/CO SERPL IA: <0.1 S/CO RATIO (ref 0–0.9)
HDLC SERPL-MCNC: 77 MG/DL
HGB BLD-MCNC: 9.6 G/DL (ref 11.1–15.9)
IMM GRANULOCYTES # BLD: 0 X10E3/UL (ref 0–0.1)
IMM GRANULOCYTES NFR BLD: 0 %
LDLC SERPL CALC-MCNC: 141 MG/DL (ref 0–99)
LYMPHOCYTES # BLD AUTO: 1 X10E3/UL (ref 0.7–3.1)
LYMPHOCYTES NFR BLD AUTO: 22 %
MCH RBC QN AUTO: 22.7 PG (ref 26.6–33)
MCHC RBC AUTO-ENTMCNC: 29.9 G/DL (ref 31.5–35.7)
MCV RBC AUTO: 76 FL (ref 79–97)
MICRODELETION SYND BLD/T FISH: NORMAL
MONOCYTES # BLD AUTO: 0.4 X10E3/UL (ref 0.1–0.9)
MONOCYTES NFR BLD AUTO: 9 %
NEUTROPHILS # BLD AUTO: 3 X10E3/UL (ref 1.4–7)
NEUTROPHILS NFR BLD AUTO: 67 %
PLATELET # BLD AUTO: 455 X10E3/UL (ref 150–379)
POTASSIUM SERPL-SCNC: 4.7 MMOL/L (ref 3.5–5.2)
PROT SERPL-MCNC: 6.9 G/DL (ref 6–8.5)
RBC # BLD AUTO: 4.23 X10E6/UL (ref 3.77–5.28)
SL AMB EGFR AFRICAN AMERICAN: 97 ML/MIN/1.73
SL AMB EGFR NON AFRICAN AMERICAN: 84 ML/MIN/1.73
SL AMB VLDL CHOLESTEROL CALC: 17 MG/DL (ref 5–40)
SODIUM SERPL-SCNC: 137 MMOL/L (ref 134–144)
T4 FREE SERPL-MCNC: 2.75 NG/DL (ref 0.82–1.77)
TRIGL SERPL-MCNC: 87 MG/DL (ref 0–149)
TSH SERPL DL<=0.005 MIU/L-ACNC: 3.56 UIU/ML (ref 0.45–4.5)
WBC # BLD AUTO: 4.5 X10E3/UL (ref 3.4–10.8)

## 2018-12-04 ENCOUNTER — TELEPHONE (OUTPATIENT)
Dept: FAMILY MEDICINE CLINIC | Facility: CLINIC | Age: 53
End: 2018-12-04

## 2018-12-04 NOTE — TELEPHONE ENCOUNTER
Reviewed labs w/ pt  Consistent with iron deficiency anemia  Would recommend starting on Iron supplements in repeating labs in 1-2 months  Free t4 elevated, normal TSH    She will review these with her endocrinologist   Juli Galeas

## 2019-03-20 ENCOUNTER — TELEPHONE (OUTPATIENT)
Dept: FAMILY MEDICINE CLINIC | Facility: CLINIC | Age: 54
End: 2019-03-20

## 2019-03-20 DIAGNOSIS — Z12.11 ENCOUNTER FOR SCREENING COLONOSCOPY: Primary | ICD-10-CM

## 2019-03-20 NOTE — TELEPHONE ENCOUNTER
Pt requesting order for colonoscopy  She had some questionable blood in her stool the other day  Order given   Jacqueline Littlejohn

## 2019-04-26 DIAGNOSIS — K62.5 RECTAL BLEEDING: Primary | ICD-10-CM

## 2019-11-25 ENCOUNTER — OFFICE VISIT (OUTPATIENT)
Dept: URGENT CARE | Facility: CLINIC | Age: 54
End: 2019-11-25
Payer: COMMERCIAL

## 2019-11-25 VITALS
HEIGHT: 63 IN | BODY MASS INDEX: 22.36 KG/M2 | HEART RATE: 64 BPM | SYSTOLIC BLOOD PRESSURE: 120 MMHG | OXYGEN SATURATION: 100 % | DIASTOLIC BLOOD PRESSURE: 70 MMHG | TEMPERATURE: 98.4 F | RESPIRATION RATE: 18 BRPM | WEIGHT: 126.2 LBS

## 2019-11-25 DIAGNOSIS — R30.0 DYSURIA: Primary | ICD-10-CM

## 2019-11-25 LAB
SL AMB  POCT GLUCOSE, UA: NEGATIVE
SL AMB LEUKOCYTE ESTERASE,UA: ABNORMAL
SL AMB POCT BILIRUBIN,UA: NEGATIVE
SL AMB POCT BLOOD,UA: NEGATIVE
SL AMB POCT CLARITY,UA: ABNORMAL
SL AMB POCT COLOR,UA: YELLOW
SL AMB POCT KETONES,UA: NEGATIVE
SL AMB POCT NITRITE,UA: NEGATIVE
SL AMB POCT PH,UA: 6.5
SL AMB POCT SPECIFIC GRAVITY,UA: 1.01
SL AMB POCT URINE PROTEIN: NEGATIVE
SL AMB POCT UROBILINOGEN: 0.2

## 2019-11-25 PROCEDURE — 99213 OFFICE O/P EST LOW 20 MIN: CPT | Performed by: PHYSICIAN ASSISTANT

## 2019-11-25 PROCEDURE — 81002 URINALYSIS NONAUTO W/O SCOPE: CPT | Performed by: PHYSICIAN ASSISTANT

## 2019-11-25 PROCEDURE — 87086 URINE CULTURE/COLONY COUNT: CPT | Performed by: PHYSICIAN ASSISTANT

## 2019-11-25 RX ORDER — NITROFURANTOIN 25; 75 MG/1; MG/1
100 CAPSULE ORAL 2 TIMES DAILY
Qty: 14 CAPSULE | Refills: 0 | Status: SHIPPED | OUTPATIENT
Start: 2019-11-25 | End: 2019-12-02

## 2019-11-25 RX ORDER — LEVOTHYROXINE SODIUM 137 UG/1
137 TABLET ORAL
COMMUNITY
Start: 2019-02-26 | End: 2021-09-15

## 2019-11-25 NOTE — PROGRESS NOTES
[facility]        NAME: Jose Braden is a 47 y o  female  : 1965    MRN: [id number]  DATE: 2019  TIME: 10:16 PM    Assessment and Plan   Dysuria [R30 0]  1  Dysuria  POCT urine dip    Urine culture    nitrofurantoin (MACROBID) 100 mg capsule         Patient Instructions     Discussed symptoms and UA results with pt  I suspect an acute uncomplicated UTI  I will start pt on Macrobid and send out sample for culture to further evaluate  In the interim, I rec increased hydration, rest, and observation  Follow up with PCP in 3-5 days  Proceed to  ER if symptoms worsen  Chief Complaint     Chief Complaint   Patient presents with    Possible UTI     Started Friday with dysuria, sl  urgency and frequency with foul odor  No abd  or back pain  No N/V or fever  History of Present Illness       Pt presents with a few day history of dysuria, malodorous urine, thin vaginal discharge  Denies urgency because she drinks a lot of water regularly  Denies flank pain, hematuria, pelvic pain, fever/chills, N/V  She reports she is prone to UTI  Review of Systems   Review of Systems   Constitutional: Negative  Respiratory: Negative  Cardiovascular: Negative  Gastrointestinal: Negative  Genitourinary: Positive for dysuria and vaginal discharge  Negative for flank pain, frequency, hematuria, pelvic pain and urgency          Malodorous urine         Current Medications       Current Outpatient Medications:     levothyroxine 137 mcg tablet, Take 137 mcg by mouth, Disp: , Rfl:     nitrofurantoin (MACROBID) 100 mg capsule, Take 1 capsule (100 mg total) by mouth 2 (two) times a day for 7 days, Disp: 14 capsule, Rfl: 0    Current Allergies     Allergies as of 2019 - Reviewed 2019   Allergen Reaction Noted    Oxycodone GI Intolerance 2018            The following portions of the patient's history were reviewed and updated as appropriate: allergies, current medications, past family history, past medical history, past social history, past surgical history and problem list      Past Medical History:   Diagnosis Date    Cancer ([address] )     thyroid    Disease of thyroid gland        Past Surgical History:   Procedure Laterality Date    FL INJECTION RIGHT HIP (ARTHROGRAM)  9/18/2018    SALPINGECTOMY Right     THYROIDECTOMY      WISDOM TOOTH EXTRACTION         Family History   Problem Relation Age of Onset    No Known Problems Mother     No Known Problems Father          Medications have been verified  Objective   /70 (BP Location: Left arm, Patient Position: Sitting, Cuff Size: Standard)   Pulse 64   Temp 98 4 °F (36 9 °C) (Tympanic)   Resp 18   Ht 5' 3" (1 6 m)   Wt 57 2 kg (126 lb 3 2 oz)   LMP 11/04/2019 (Exact Date)   SpO2 100%   BMI 22 36 kg/m²        Physical Exam     Physical Exam   Constitutional: She is oriented to person, place, and time  She appears well-developed and well-nourished  No distress  HENT:   Mouth/Throat: Oropharynx is clear and moist and mucous membranes are normal    Cardiovascular: Normal rate, regular rhythm and normal heart sounds  No murmur heard  Pulmonary/Chest: Effort normal and breath sounds normal    Abdominal: There is no CVA tenderness  Neurological: She is alert and oriented to person, place, and time  Psychiatric: She has a normal mood and affect  Vitals reviewed

## 2019-11-25 NOTE — PATIENT INSTRUCTIONS

## 2019-11-26 LAB — BACTERIA UR CULT: NORMAL

## 2019-12-22 ENCOUNTER — OFFICE VISIT (OUTPATIENT)
Dept: URGENT CARE | Facility: CLINIC | Age: 54
End: 2019-12-22
Payer: COMMERCIAL

## 2019-12-22 VITALS
HEIGHT: 63 IN | BODY MASS INDEX: 21.26 KG/M2 | TEMPERATURE: 98.1 F | OXYGEN SATURATION: 99 % | SYSTOLIC BLOOD PRESSURE: 121 MMHG | HEART RATE: 83 BPM | WEIGHT: 120 LBS | DIASTOLIC BLOOD PRESSURE: 70 MMHG | RESPIRATION RATE: 16 BRPM

## 2019-12-22 DIAGNOSIS — J06.9 VIRAL URI: ICD-10-CM

## 2019-12-22 DIAGNOSIS — J02.9 SORE THROAT: Primary | ICD-10-CM

## 2019-12-22 LAB — S PYO AG THROAT QL: NEGATIVE

## 2019-12-22 PROCEDURE — 87880 STREP A ASSAY W/OPTIC: CPT | Performed by: PHYSICIAN ASSISTANT

## 2019-12-22 PROCEDURE — 99213 OFFICE O/P EST LOW 20 MIN: CPT | Performed by: PHYSICIAN ASSISTANT

## 2019-12-22 RX ORDER — BENZONATATE 200 MG/1
200 CAPSULE ORAL 3 TIMES DAILY PRN
Qty: 20 CAPSULE | Refills: 0 | Status: SHIPPED | OUTPATIENT
Start: 2019-12-22 | End: 2020-02-25 | Stop reason: ALTCHOICE

## 2019-12-22 RX ORDER — FLUTICASONE PROPIONATE 50 MCG
2 SPRAY, SUSPENSION (ML) NASAL DAILY
Qty: 16 G | Refills: 0 | Status: SHIPPED | OUTPATIENT
Start: 2019-12-22 | End: 2020-11-16

## 2019-12-22 NOTE — PATIENT INSTRUCTIONS

## 2019-12-22 NOTE — PROGRESS NOTES
[facility]        NAME: Ashton Fabry is a 47 y o  female  : 1965    MRN: [id number]  DATE: 2019  TIME: 10:37 AM    Assessment and Plan   Sore throat [J02 9]  1  Sore throat  POCT rapid strepA   2  Viral URI  fluticasone (FLONASE) 50 mcg/act nasal spray    benzonatate (TESSALON) 200 MG capsule         Patient Instructions     Discussed condition with patient  Rapid strep a screen is negative  I suspect an acute viral URI for which I prescribed her Flonase and Tessalon Sarah recommend hydration, rest, discussed OTC cold meds, and observation  Follow up with PCP in 3-5 days  Proceed to  ER if symptoms worsen  Chief Complaint     Chief Complaint   Patient presents with    Sore Throat     w/ sinus pressure, ear pain x 3 days  Taking advil and mucinex OTC  History of Present Illness       Pt presents with 2 day history of ST, nasal congestion, B/L ear pain, productive cough, PND  Had fever up to 100 5 F, chills  Denies N/V/D  She has taken Mucinex and Advil  Denies hx of asthma/allergies  Does not smoke or vape  She has had the flu shot  Review of Systems   Review of Systems   Constitutional: Positive for fever  HENT: Positive for congestion, ear pain, postnasal drip and sore throat  Respiratory: Positive for cough  Cardiovascular: Negative  Gastrointestinal: Negative  Genitourinary: Negative            Current Medications       Current Outpatient Medications:     levothyroxine 137 mcg tablet, Take 137 mcg by mouth, Disp: , Rfl:     benzonatate (TESSALON) 200 MG capsule, Take 1 capsule (200 mg total) by mouth 3 (three) times a day as needed for cough, Disp: 20 capsule, Rfl: 0    fluticasone (FLONASE) 50 mcg/act nasal spray, 2 sprays into each nostril daily, Disp: 16 g, Rfl: 0    Current Allergies     Allergies as of 2019 - Reviewed 2019   Allergen Reaction Noted    Oxycodone GI Intolerance 2018            The following portions of the patient's history were reviewed and updated as appropriate: allergies, current medications, past family history, past medical history, past social history, past surgical history and problem list      Past Medical History:   Diagnosis Date    Cancer ([address] )     thyroid    Disease of thyroid gland        Past Surgical History:   Procedure Laterality Date    FL INJECTION RIGHT HIP (ARTHROGRAM)  9/18/2018    SALPINGECTOMY Right     THYROIDECTOMY      WISDOM TOOTH EXTRACTION         Family History   Problem Relation Age of Onset    No Known Problems Mother     No Known Problems Father          Medications have been verified  Objective   /70 (BP Location: Left arm, Patient Position: Sitting, Cuff Size: Standard)   Pulse 83   Temp 98 1 °F (36 7 °C) (Tympanic)   Resp 16   Ht 5' 3" (1 6 m)   Wt 54 4 kg (120 lb)   SpO2 99%   BMI 21 26 kg/m²        Physical Exam     Physical Exam   Constitutional: She is oriented to person, place, and time  She appears well-developed and well-nourished  No distress  HENT:   Right Ear: Hearing, tympanic membrane, external ear and ear canal normal    Left Ear: Hearing, tympanic membrane, external ear and ear canal normal    Nose: Mucosal edema (B/L boggy turbinates) present  Mouth/Throat: Mucous membranes are normal  Posterior oropharyngeal erythema (PND) present  No oropharyngeal exudate  Neck: Neck supple  Cardiovascular: Normal rate, regular rhythm and normal heart sounds  Pulmonary/Chest: Effort normal and breath sounds normal    Lymphadenopathy:     She has no cervical adenopathy  Neurological: She is alert and oriented to person, place, and time  Psychiatric: She has a normal mood and affect  Vitals reviewed

## 2020-02-24 NOTE — PROGRESS NOTES
FAMILY PRACTICE HEALTH MAINTENANCE OFFICE VISIT  [facility]    NAME: Ponce Castle  AGE: 47 y o  SEX: female  : 1965     DATE: 2020    Assessment and Plan     1  Well adult exam    2  Mixed hyperlipidemia  -     CBC and differential; Future  -     Comprehensive metabolic panel; Future  -     Lipid panel; Future  -     CBC and differential  -     Comprehensive metabolic panel  -     Lipid panel    3  BMI 21 0-21 9, adult        · Patient Counseling:   · Nutrition: Stressed importance of a well balanced diet, moderation of sodium/saturated fat, caloric balance and sufficient intake of fiber  · Exercise: Stressed the importance of regular exercise with a goal of 150 minutes per week  · Dental Health: Discussed daily flossing and brushing and regular dental visits     · Immunizations reviewed: Up To Date  · Discussed benefits of:  Colon Cancer Screening, Mammogram , Cervical Cancer screening and Screening labs  BMI Counseling: Body mass index is 21 86 kg/m²  Discussed with patient's BMI with her  The BMI is normal   Return in about 1 year (around 2021)  Chief Complaint     Chief Complaint   Patient presents with    Physical Exam     no pap  vfrma       History of Present Illness     Here for CPE  Sees gyn for annual care  No complaints        Well Adult Physical   Patient here for a comprehensive physical exam       Diet and Physical Activity  Diet: well balanced diet  Exercise: several times per week      Depression Screen  PHQ-9 Depression Screening    PHQ-9:    Frequency of the following problems over the past two weeks:       Little interest or pleasure in doing things:  0 - not at all  Feeling down, depressed, or hopeless:  0 - not at all  PHQ-2 Score:  0          General Health  Hearing: Normal:  bilateral  Vision: no vision problems  Dental: regular dental visits    Reproductive Health  No issues  and Follows with gynecologist      The following portions of the patient's history were reviewed and updated as appropriate: allergies, current medications, past family history, past medical history, past social history, past surgical history and problem list     Review of Systems     Review of Systems   Constitutional: Negative  HENT: Negative  Eyes: Negative  Respiratory: Negative  Cardiovascular: Negative  Gastrointestinal: Negative  Endocrine: Negative  Genitourinary: Negative  Musculoskeletal: Negative  Skin: Negative  Allergic/Immunologic: Negative  Neurological: Negative  Hematological: Negative  Psychiatric/Behavioral: Negative  Past Medical History     Past Medical History:   Diagnosis Date    Cancer [address] [address]     thyroid    Disease of thyroid gland        Past Surgical History     Past Surgical History:   Procedure Laterality Date    FL INJECTION RIGHT HIP (ARTHROGRAM)  9/18/2018    SALPINGECTOMY Right     THYROIDECTOMY      WISDOM TOOTH EXTRACTION         Social History     Social History     Socioeconomic History    Marital status: /Civil Union     Spouse name: None    Number of children: None    Years of education: None    Highest education level: None   Occupational History    None   Social Needs    Financial resource strain: None    Food insecurity:     Worry: None     Inability: None    Transportation needs:     Medical: None     Non-medical: None   Tobacco Use    Smoking status: Never Smoker    Smokeless tobacco: Never Used   Substance and Sexual Activity    Alcohol use:  Yes     Alcohol/week: 3 0 standard drinks     Types: 3 Glasses of wine per week     Comment: occasional    Drug use: No    Sexual activity: None   Lifestyle    Physical activity:     Days per week: None     Minutes per session: None    Stress: None   Relationships    Social connections:     Talks on phone: None     Gets together: None     Attends Baptism service: None     Active member of club or organization: None     Attends meetings of clubs or organizations: None     Relationship status: None    Intimate partner violence:     Fear of current or ex partner: None     Emotionally abused: None     Physically abused: None     Forced sexual activity: None   Other Topics Concern    None   Social History Narrative    None       Family History     Family History   Problem Relation Age of Onset    No Known Problems Mother     No Known Problems Father        Current Medications       Current Outpatient Medications:     fluticasone (FLONASE) 50 mcg/act nasal spray, 2 sprays into each nostril daily, Disp: 16 g, Rfl: 0    levothyroxine 137 mcg tablet, Take 137 mcg by mouth, Disp: , Rfl:      Allergies     Allergies   Allergen Reactions    Oxycodone GI Intolerance     (pain medications in general)       Objective     /78   Pulse 70   Temp 98 2 °F (36 8 °C)   Resp 16   Ht 5' 3" (1 6 m)   Wt 56 kg (123 lb 6 4 oz)   LMP 02/16/2020 (Exact Date)   SpO2 100%   BMI 21 86 kg/m²      Physical Exam   Constitutional: She is oriented to person, place, and time  She appears well-developed and well-nourished  No distress  HENT:   Head: Normocephalic and atraumatic  Right Ear: External ear normal    Left Ear: External ear normal    Mouth/Throat: Oropharynx is clear and moist    Eyes: EOM are normal    Neck: Normal range of motion  Neck supple  No thyromegaly present  Cardiovascular: Normal rate, regular rhythm, normal heart sounds and intact distal pulses  Exam reveals no gallop and no friction rub  No murmur heard  Pulmonary/Chest: Effort normal and breath sounds normal  She has no wheezes  She has no rales  Abdominal: Soft  Bowel sounds are normal  She exhibits no mass  There is no tenderness  There is no rebound  Musculoskeletal: Normal range of motion  She exhibits no deformity  Lymphadenopathy:     She has no cervical adenopathy     Neurological: She is alert and oriented to person, place, and time  She has normal reflexes  She displays normal reflexes  No cranial nerve deficit  She exhibits normal muscle tone  Coordination normal    Skin: Skin is warm and dry  No rash noted  She is not diaphoretic  Psychiatric: She has a normal mood and affect   Her behavior is normal  Judgment and thought content normal           Visual Acuity Screening    Right eye Left eye Both eyes   Without correction: 20/40 20/30 20/25   With correction:              MD CHAPITO Ortiz [facility]

## 2020-02-25 ENCOUNTER — TELEPHONE (OUTPATIENT)
Dept: FAMILY MEDICINE CLINIC | Facility: CLINIC | Age: 55
End: 2020-02-25

## 2020-02-25 ENCOUNTER — OFFICE VISIT (OUTPATIENT)
Dept: FAMILY MEDICINE CLINIC | Facility: CLINIC | Age: 55
End: 2020-02-25
Payer: COMMERCIAL

## 2020-02-25 VITALS
HEART RATE: 70 BPM | HEIGHT: 63 IN | BODY MASS INDEX: 21.86 KG/M2 | SYSTOLIC BLOOD PRESSURE: 120 MMHG | RESPIRATION RATE: 16 BRPM | WEIGHT: 123.4 LBS | OXYGEN SATURATION: 100 % | TEMPERATURE: 98.2 F | DIASTOLIC BLOOD PRESSURE: 78 MMHG

## 2020-02-25 DIAGNOSIS — Z00.00 WELL ADULT EXAM: Primary | ICD-10-CM

## 2020-02-25 DIAGNOSIS — E78.2 MIXED HYPERLIPIDEMIA: ICD-10-CM

## 2020-02-25 DIAGNOSIS — Z12.11 ENCOUNTER FOR SCREENING COLONOSCOPY: Primary | ICD-10-CM

## 2020-02-25 PROBLEM — Z85.850 HISTORY OF MALIGNANT NEOPLASM OF THYROID: Status: ACTIVE | Noted: 2020-02-25

## 2020-02-25 PROCEDURE — 99396 PREV VISIT EST AGE 40-64: CPT | Performed by: INTERNAL MEDICINE

## 2020-02-25 PROCEDURE — 3008F BODY MASS INDEX DOCD: CPT | Performed by: INTERNAL MEDICINE

## 2020-10-02 LAB
ALBUMIN SERPL-MCNC: 4.4 G/DL (ref 3.8–4.9)
ALBUMIN/GLOB SERPL: 1.6 {RATIO} (ref 1.2–2.2)
ALP SERPL-CCNC: 58 IU/L (ref 39–117)
ALT SERPL-CCNC: 10 IU/L (ref 0–32)
AST SERPL-CCNC: 16 IU/L (ref 0–40)
BASOPHILS # BLD AUTO: 0.1 X10E3/UL (ref 0–0.2)
BASOPHILS NFR BLD AUTO: 1 %
BILIRUB SERPL-MCNC: 0.4 MG/DL (ref 0–1.2)
BUN SERPL-MCNC: 11 MG/DL (ref 6–24)
BUN/CREAT SERPL: 14 (ref 9–23)
CALCIUM SERPL-MCNC: 9.5 MG/DL (ref 8.7–10.2)
CHLORIDE SERPL-SCNC: 102 MMOL/L (ref 96–106)
CHOLEST SERPL-MCNC: 269 MG/DL (ref 100–199)
CHOLEST/HDLC SERPL: 2.6 RATIO (ref 0–4.4)
CO2 SERPL-SCNC: 21 MMOL/L (ref 20–29)
CREAT SERPL-MCNC: 0.8 MG/DL (ref 0.57–1)
EOSINOPHIL # BLD AUTO: 0 X10E3/UL (ref 0–0.4)
EOSINOPHIL NFR BLD AUTO: 1 %
ERYTHROCYTE [DISTWIDTH] IN BLOOD BY AUTOMATED COUNT: 17.2 % (ref 11.7–15.4)
GLOBULIN SER-MCNC: 2.8 G/DL (ref 1.5–4.5)
GLUCOSE SERPL-MCNC: 95 MG/DL (ref 65–99)
HCT VFR BLD AUTO: 31.3 % (ref 34–46.6)
HDLC SERPL-MCNC: 104 MG/DL
HGB BLD-MCNC: 9.8 G/DL (ref 11.1–15.9)
IMM GRANULOCYTES # BLD: 0 X10E3/UL (ref 0–0.1)
IMM GRANULOCYTES NFR BLD: 0 %
LDLC SERPL CALC-MCNC: 153 MG/DL (ref 0–99)
LYMPHOCYTES # BLD AUTO: 1.1 X10E3/UL (ref 0.7–3.1)
LYMPHOCYTES NFR BLD AUTO: 21 %
MCH RBC QN AUTO: 24.5 PG (ref 26.6–33)
MCHC RBC AUTO-ENTMCNC: 31.3 G/DL (ref 31.5–35.7)
MCV RBC AUTO: 78 FL (ref 79–97)
MICRODELETION SYND BLD/T FISH: NORMAL
MONOCYTES # BLD AUTO: 0.4 X10E3/UL (ref 0.1–0.9)
MONOCYTES NFR BLD AUTO: 7 %
NEUTROPHILS # BLD AUTO: 3.6 X10E3/UL (ref 1.4–7)
NEUTROPHILS NFR BLD AUTO: 70 %
PLATELET # BLD AUTO: 431 X10E3/UL (ref 150–450)
POTASSIUM SERPL-SCNC: 4.1 MMOL/L (ref 3.5–5.2)
PROT SERPL-MCNC: 7.2 G/DL (ref 6–8.5)
RBC # BLD AUTO: 4 X10E6/UL (ref 3.77–5.28)
SL AMB EGFR AFRICAN AMERICAN: 96 ML/MIN/1.73
SL AMB EGFR NON AFRICAN AMERICAN: 83 ML/MIN/1.73
SL AMB VLDL CHOLESTEROL CALC: 12 MG/DL (ref 5–40)
SODIUM SERPL-SCNC: 138 MMOL/L (ref 134–144)
TRIGL SERPL-MCNC: 77 MG/DL (ref 0–149)
WBC # BLD AUTO: 5.2 X10E3/UL (ref 3.4–10.8)

## 2020-10-23 ENCOUNTER — OFFICE VISIT (OUTPATIENT)
Dept: URGENT CARE | Facility: CLINIC | Age: 55
End: 2020-10-23
Payer: COMMERCIAL

## 2020-10-23 VITALS
DIASTOLIC BLOOD PRESSURE: 86 MMHG | RESPIRATION RATE: 16 BRPM | SYSTOLIC BLOOD PRESSURE: 160 MMHG | BODY MASS INDEX: 21.17 KG/M2 | TEMPERATURE: 97 F | OXYGEN SATURATION: 100 % | HEART RATE: 67 BPM | HEIGHT: 64 IN | WEIGHT: 124 LBS

## 2020-10-23 DIAGNOSIS — N39.0 URINARY TRACT INFECTION WITHOUT HEMATURIA, SITE UNSPECIFIED: Primary | ICD-10-CM

## 2020-10-23 DIAGNOSIS — R30.0 BURNING WITH URINATION: ICD-10-CM

## 2020-10-23 LAB
SL AMB  POCT GLUCOSE, UA: ABNORMAL
SL AMB LEUKOCYTE ESTERASE,UA: ABNORMAL
SL AMB POCT BILIRUBIN,UA: ABNORMAL
SL AMB POCT BLOOD,UA: ABNORMAL
SL AMB POCT CLARITY,UA: ABNORMAL
SL AMB POCT COLOR,UA: YELLOW
SL AMB POCT KETONES,UA: ABNORMAL
SL AMB POCT NITRITE,UA: ABNORMAL
SL AMB POCT PH,UA: 6.5
SL AMB POCT SPECIFIC GRAVITY,UA: 1.02
SL AMB POCT URINE PROTEIN: 30
SL AMB POCT UROBILINOGEN: 0.2

## 2020-10-23 PROCEDURE — 81002 URINALYSIS NONAUTO W/O SCOPE: CPT | Performed by: PHYSICIAN ASSISTANT

## 2020-10-23 PROCEDURE — 99213 OFFICE O/P EST LOW 20 MIN: CPT | Performed by: PHYSICIAN ASSISTANT

## 2020-10-23 PROCEDURE — 87186 SC STD MICRODIL/AGAR DIL: CPT | Performed by: PHYSICIAN ASSISTANT

## 2020-10-23 PROCEDURE — 87077 CULTURE AEROBIC IDENTIFY: CPT | Performed by: PHYSICIAN ASSISTANT

## 2020-10-23 PROCEDURE — 87086 URINE CULTURE/COLONY COUNT: CPT | Performed by: PHYSICIAN ASSISTANT

## 2020-10-23 RX ORDER — NITROFURANTOIN 25; 75 MG/1; MG/1
100 CAPSULE ORAL 2 TIMES DAILY
Qty: 10 CAPSULE | Refills: 0 | Status: SHIPPED | OUTPATIENT
Start: 2020-10-23 | End: 2020-10-28

## 2020-10-26 LAB — BACTERIA UR CULT: ABNORMAL

## 2020-11-02 ENCOUNTER — TELEPHONE (OUTPATIENT)
Dept: URGENT CARE | Facility: CLINIC | Age: 55
End: 2020-11-02

## 2020-11-16 ENCOUNTER — OFFICE VISIT (OUTPATIENT)
Dept: URGENT CARE | Facility: CLINIC | Age: 55
End: 2020-11-16
Payer: COMMERCIAL

## 2020-11-16 VITALS
TEMPERATURE: 97.2 F | SYSTOLIC BLOOD PRESSURE: 120 MMHG | BODY MASS INDEX: 21.78 KG/M2 | HEIGHT: 64 IN | RESPIRATION RATE: 18 BRPM | OXYGEN SATURATION: 100 % | DIASTOLIC BLOOD PRESSURE: 90 MMHG | WEIGHT: 127.6 LBS | HEART RATE: 66 BPM

## 2020-11-16 DIAGNOSIS — R30.0 DYSURIA: Primary | ICD-10-CM

## 2020-11-16 LAB
SL AMB  POCT GLUCOSE, UA: NEGATIVE
SL AMB LEUKOCYTE ESTERASE,UA: ABNORMAL
SL AMB POCT BILIRUBIN,UA: NEGATIVE
SL AMB POCT BLOOD,UA: NEGATIVE
SL AMB POCT CLARITY,UA: CLEAR
SL AMB POCT COLOR,UA: YELLOW
SL AMB POCT KETONES,UA: NEGATIVE
SL AMB POCT NITRITE,UA: POSITIVE
SL AMB POCT PH,UA: 5
SL AMB POCT SPECIFIC GRAVITY,UA: 1.01
SL AMB POCT URINE PROTEIN: NEGATIVE
SL AMB POCT UROBILINOGEN: 0.2

## 2020-11-16 PROCEDURE — 81002 URINALYSIS NONAUTO W/O SCOPE: CPT | Performed by: PHYSICIAN ASSISTANT

## 2020-11-16 PROCEDURE — 1036F TOBACCO NON-USER: CPT | Performed by: PHYSICIAN ASSISTANT

## 2020-11-16 PROCEDURE — 99213 OFFICE O/P EST LOW 20 MIN: CPT | Performed by: PHYSICIAN ASSISTANT

## 2020-11-16 PROCEDURE — 3725F SCREEN DEPRESSION PERFORMED: CPT | Performed by: PHYSICIAN ASSISTANT

## 2020-11-16 PROCEDURE — 3008F BODY MASS INDEX DOCD: CPT | Performed by: PHYSICIAN ASSISTANT

## 2020-11-16 PROCEDURE — 87086 URINE CULTURE/COLONY COUNT: CPT | Performed by: PHYSICIAN ASSISTANT

## 2020-11-16 RX ORDER — SULFAMETHOXAZOLE AND TRIMETHOPRIM 800; 160 MG/1; MG/1
1 TABLET ORAL EVERY 12 HOURS SCHEDULED
Qty: 10 TABLET | Refills: 0 | Status: SHIPPED | OUTPATIENT
Start: 2020-11-16 | End: 2020-11-21

## 2020-11-18 LAB — BACTERIA UR CULT: NORMAL

## 2020-12-16 ENCOUNTER — OFFICE VISIT (OUTPATIENT)
Dept: URGENT CARE | Facility: CLINIC | Age: 55
End: 2020-12-16
Payer: COMMERCIAL

## 2020-12-16 VITALS
DIASTOLIC BLOOD PRESSURE: 78 MMHG | WEIGHT: 139 LBS | TEMPERATURE: 97 F | SYSTOLIC BLOOD PRESSURE: 161 MMHG | OXYGEN SATURATION: 100 % | HEIGHT: 64 IN | BODY MASS INDEX: 23.73 KG/M2 | HEART RATE: 63 BPM | RESPIRATION RATE: 17 BRPM

## 2020-12-16 DIAGNOSIS — H69.91 DISORDER OF RIGHT EUSTACHIAN TUBE: Primary | ICD-10-CM

## 2020-12-16 PROCEDURE — 3008F BODY MASS INDEX DOCD: CPT | Performed by: PHYSICIAN ASSISTANT

## 2020-12-16 PROCEDURE — 99213 OFFICE O/P EST LOW 20 MIN: CPT | Performed by: PHYSICIAN ASSISTANT

## 2020-12-16 RX ORDER — FLUTICASONE PROPIONATE 50 MCG
2 SPRAY, SUSPENSION (ML) NASAL DAILY
Qty: 16 G | Refills: 0 | Status: SHIPPED | OUTPATIENT
Start: 2020-12-16 | End: 2021-03-16

## 2021-01-12 ENCOUNTER — OFFICE VISIT (OUTPATIENT)
Dept: URGENT CARE | Facility: CLINIC | Age: 56
End: 2021-01-12
Payer: COMMERCIAL

## 2021-01-12 VITALS
DIASTOLIC BLOOD PRESSURE: 86 MMHG | OXYGEN SATURATION: 100 % | RESPIRATION RATE: 16 BRPM | HEART RATE: 68 BPM | WEIGHT: 125 LBS | BODY MASS INDEX: 21.34 KG/M2 | SYSTOLIC BLOOD PRESSURE: 130 MMHG | HEIGHT: 64 IN | TEMPERATURE: 98 F

## 2021-01-12 DIAGNOSIS — R30.9 PAINFUL URINATION: Primary | ICD-10-CM

## 2021-01-12 DIAGNOSIS — N39.0 URINARY TRACT INFECTION WITHOUT HEMATURIA, SITE UNSPECIFIED: ICD-10-CM

## 2021-01-12 LAB
SL AMB  POCT GLUCOSE, UA: ABNORMAL
SL AMB LEUKOCYTE ESTERASE,UA: ABNORMAL
SL AMB POCT BILIRUBIN,UA: ABNORMAL
SL AMB POCT BLOOD,UA: ABNORMAL
SL AMB POCT CLARITY,UA: ABNORMAL
SL AMB POCT COLOR,UA: ABNORMAL
SL AMB POCT KETONES,UA: ABNORMAL
SL AMB POCT NITRITE,UA: ABNORMAL
SL AMB POCT PH,UA: 6
SL AMB POCT SPECIFIC GRAVITY,UA: 1
SL AMB POCT URINE PROTEIN: ABNORMAL
SL AMB POCT UROBILINOGEN: 0.2

## 2021-01-12 PROCEDURE — 87086 URINE CULTURE/COLONY COUNT: CPT | Performed by: PHYSICIAN ASSISTANT

## 2021-01-12 PROCEDURE — 99213 OFFICE O/P EST LOW 20 MIN: CPT | Performed by: PHYSICIAN ASSISTANT

## 2021-01-12 PROCEDURE — 81002 URINALYSIS NONAUTO W/O SCOPE: CPT | Performed by: PHYSICIAN ASSISTANT

## 2021-01-12 PROCEDURE — 3008F BODY MASS INDEX DOCD: CPT | Performed by: PHYSICIAN ASSISTANT

## 2021-01-12 RX ORDER — NITROFURANTOIN 25; 75 MG/1; MG/1
100 CAPSULE ORAL 2 TIMES DAILY
Qty: 10 CAPSULE | Refills: 0 | Status: SHIPPED | OUTPATIENT
Start: 2021-01-12 | End: 2021-01-17

## 2021-01-12 NOTE — PROGRESS NOTES
[facility]    NAME: Jovita Lazar is a 54 y o  female  : 1965    MRN: [id number]  DATE: 2021  TIME: 10:40 AM    Assessment and Plan   Painful urination [R30 9]  1  Painful urination  Urine culture    POCT urine dip    nitrofurantoin (MACROBID) 100 mg capsule   2  Urinary tract infection without hematuria, site unspecified       Patient Instructions   Urinary tract infection  UA + blood and leuks, will sent out urine culture and call if need to change antibiotic otherwise once you have started antibiotic need to finish it  Increase fluids  Can take azo over the counter as needed for pain  Follow up with PCP in 3-5 days  Proceed to  ER if symptoms worsen  Chief Complaint     Chief Complaint   Patient presents with    Possible UTI     Pt reports of worsening UTI s/s with increased pain and frequency  S/S started approx 2 days ago         History of Present Illness       Dalton Childers is a 59-year-old female who presents to clinic cleaning of frequency and dysuria x2 days  She denies any fever, chills, urgency, hematuria, abdominal pain, back pain, or vaginal symptoms  She states that she was seen in November for similar complaints and treated for urinary tract infection with antibiotics however she felt the symptoms never completely resolved  Review of Systems   Review of Systems   Constitutional: Negative for chills, fatigue and fever  Gastrointestinal: Negative for abdominal pain  Genitourinary: Positive for dysuria and frequency  Negative for flank pain, hematuria, urgency, vaginal bleeding, vaginal discharge and vaginal pain  Musculoskeletal: Negative for back pain       Current Medications       Current Outpatient Medications:     fluticasone (FLONASE) 50 mcg/act nasal spray, 2 sprays into each nostril daily, Disp: 16 g, Rfl: 0    levothyroxine 137 mcg tablet, Take 137 mcg by mouth, Disp: , Rfl:     nitrofurantoin (MACROBID) 100 mg capsule, Take 1 capsule (100 mg total) by mouth 2 (two) times a day for 5 days, Disp: 10 capsule, Rfl: 0    nystatin-triamcinolone (MYCOLOG-II) cream, as needed, Disp: , Rfl:     Current Allergies     Allergies as of 01/12/2021 - Reviewed 01/12/2021   Allergen Reaction Noted    Oxycodone GI Intolerance 09/07/2018            The following portions of the patient's history were reviewed and updated as appropriate: allergies, current medications, past family history, past medical history, past social history, past surgical history and problem list      Past Medical History:   Diagnosis Date    Cancer ([address] )     thyroid    Disease of thyroid gland     Urinary tract infection        Past Surgical History:   Procedure Laterality Date    FL INJECTION RIGHT HIP (ARTHROGRAM)  9/18/2018    SALPINGECTOMY Right     THYROIDECTOMY      WISDOM TOOTH EXTRACTION         Family History   Problem Relation Age of Onset    No Known Problems Mother     No Known Problems Father          Medications have been verified  Objective   /86   Pulse 68   Temp 98 °F (36 7 °C)   Resp 16   Ht 5' 4" (1 626 m)   Wt 56 7 kg (125 lb)   SpO2 100%   BMI 21 46 kg/m²   No LMP recorded  Physical Exam     Physical Exam  Vitals signs and nursing note reviewed  Constitutional:       General: She is not in acute distress  Appearance: Normal appearance  She is not ill-appearing  Cardiovascular:      Rate and Rhythm: Normal rate and regular rhythm  Heart sounds: Normal heart sounds  Pulmonary:      Effort: Pulmonary effort is normal       Breath sounds: Normal breath sounds  Abdominal:      General: Bowel sounds are normal  There is no distension  Palpations: Abdomen is soft  Tenderness: There is no abdominal tenderness  There is no right CVA tenderness, left CVA tenderness or guarding  Neurological:      Mental Status: She is alert and oriented to person, place, and time     Psychiatric:         Mood and Affect: Mood normal  Behavior: Behavior normal

## 2021-01-12 NOTE — PATIENT INSTRUCTIONS
Urinary tract infection  UA + blood and leuks, will sent out urine culture and call if need to change antibiotic otherwise once you have started antibiotic need to finish it  Increase fluids  Can take azo over the counter as needed for pain  Follow up with PCP in 3-5 days  Proceed to  ER if symptoms worsen  Urinary Tract Infection in Women   WHAT YOU NEED TO KNOW:   A urinary tract infection (UTI) is caused by bacteria that get inside your urinary tract  Most bacteria that enter your urinary tract come out when you urinate  If the bacteria stay in your urinary tract, you may get an infection  Your urinary tract includes your kidneys, ureters, bladder, and urethra  Urine is made in your kidneys, and it flows from the ureters to the bladder  Urine leaves the bladder through the urethra  A UTI is more common in your lower urinary tract, which includes your bladder and urethra  DISCHARGE INSTRUCTIONS:   Return to the emergency department if:   · You are urinating very little or not at all  · You have a high fever with shaking chills  · You have side or back pain that gets worse  Call your doctor if:   · You have a fever  · You do not feel better after 2 days of taking antibiotics  · You are vomiting  · You have questions or concerns about your condition or care  Medicines:   · Antibiotics  help fight a bacterial infection  If you have UTIs often (called recurrent UTIs), you may be given antibiotics to take regularly  You will be given directions for when and how to use antibiotics  The goal is to prevent UTIs but not cause antibiotic resistance by using antibiotics too often  · Medicines  may be given to decrease pain and burning when you urinate  They will also help decrease the feeling that you need to urinate often  These medicines will make your urine orange or red  · Take your medicine as directed    Contact your healthcare provider if you think your medicine is not helping or if you have side effects  Tell him or her if you are allergic to any medicine  Keep a list of the medicines, vitamins, and herbs you take  Include the amounts, and when and why you take them  Bring the list or the pill bottles to follow-up visits  Carry your medicine list with you in case of an emergency  Follow up with your healthcare provider as directed:  Write down your questions so you remember to ask them during your visits  Prevent another UTI:   · Empty your bladder often  Urinate and empty your bladder as soon as you feel the need  Do not hold your urine for long periods of time  · Wipe from front to back after you urinate or have a bowel movement  This will help prevent germs from getting into your urinary tract through your urethra  · Drink liquids as directed  Ask how much liquid to drink each day and which liquids are best for you  You may need to drink more liquids than usual to help flush out the bacteria  Do not drink alcohol, caffeine, or citrus juices  These can irritate your bladder and increase your symptoms  Your healthcare provider may recommend cranberry juice to help prevent a UTI  · Urinate after you have sex  This can help flush out bacteria passed during sex  · Do not douche or use feminine deodorants  These can change the chemical balance in your vagina  · Change sanitary pads or tampons often  This will help prevent germs from getting into your urinary tract  · Talk to your healthcare provider about your birth control method  You may need to change your method if it is increasing your risk for UTIs  · Wear cotton underwear and clothes that are loose  Tight pants and nylon underwear can trap moisture and cause bacteria to grow  · Vaginal estrogen may be recommended  This medicine helps prevent UTIs in women who have gone through menopause or are in lo-menopause  · Do pelvic muscle exercises often    Pelvic muscle exercises may help you start and stop urinating  Strong pelvic muscles may help you empty your bladder easier  Squeeze these muscles tightly for 5 seconds like you are trying to hold back urine  Then relax for 5 seconds  Gradually work up to squeezing for 10 seconds  Do 3 sets of 15 repetitions a day, or as directed  © Copyright [address] Information is for End User's use only and may not be sold, redistributed or otherwise used for commercial purposes  All illustrations and images included in CareNotes® are the copyrighted property of A D A M [employer] Inc  or [facility] Seth Olivares   The above information is an  only  It is not intended as medical advice for individual conditions or treatments  Talk to your doctor, nurse or pharmacist before following any medical regimen to see if it is safe and effective for you

## 2021-01-13 LAB — BACTERIA UR CULT: NORMAL

## 2021-02-01 ENCOUNTER — VBI (OUTPATIENT)
Dept: ADMINISTRATIVE | Facility: OTHER | Age: 56
End: 2021-02-01

## 2021-02-11 ENCOUNTER — OFFICE VISIT (OUTPATIENT)
Dept: OBGYN CLINIC | Facility: CLINIC | Age: 56
End: 2021-02-11
Payer: COMMERCIAL

## 2021-02-11 VITALS
HEIGHT: 64 IN | DIASTOLIC BLOOD PRESSURE: 80 MMHG | BODY MASS INDEX: 22.23 KG/M2 | SYSTOLIC BLOOD PRESSURE: 130 MMHG | WEIGHT: 130.2 LBS

## 2021-02-11 DIAGNOSIS — Z12.31 BREAST CANCER SCREENING BY MAMMOGRAM: ICD-10-CM

## 2021-02-11 DIAGNOSIS — Z12.11 ENCOUNTER FOR SCREENING COLONOSCOPY: ICD-10-CM

## 2021-02-11 DIAGNOSIS — Z01.419 ENCNTR FOR GYN EXAM (GENERAL) (ROUTINE) W/O ABN FINDINGS: Primary | ICD-10-CM

## 2021-02-11 PROCEDURE — 99386 PREV VISIT NEW AGE 40-64: CPT | Performed by: NURSE PRACTITIONER

## 2021-02-11 NOTE — PROGRESS NOTES
Assessment/Plan   Diagnoses and all orders for this visit:    Encntr for gyn exam (general) (routine) w/o abn findings    Encounter for screening colonoscopy  -     Ambulatory referral to Gastroenterology; Future    Breast cancer screening by mammogram  -     Mammo screening bilateral w 3d & cad; Future        Discussion    Reviewed normal exam today  Pap deferred  Reviewed lo-menopause and menopause  Discussed menopause 12 months no menses  Reviewed common symptoms of menopause, most women can go though menopause without any issues, if symptoms become intolerable schedule appointment to discuss HRT  Normal breast exam today  Monthly SBEs advised  Mammograms yearly  Rx for mammogram given  Encourage at least 1200 mg calcium citrate + 2000 IUs vitamin D3 divided through diet and supplement throughout the day  Encourage 30-40 min weight bearing exercise most days of week  Colon cancer screening with a colonoscopy is recommended starting at age 39 and reviewed benefits  Referral for GI given  All questions have been answered to her satisfaction  RTO for annual or sooner if needed    Subjective     Alek Nunez is a 54 y o  female new patient who presents for annual well woman exam    Right Salpingectomy  Last exam 2019 Pap 9/28/2018 Normal HPV negative  Pap guidelines reviewed with patient  Pap deferred today  Pt denies any abnormal vaginal discharge, itching, or odor  Pt in a mutually exclusive relationship () with a male partner and denies the need for STD testing today  Menstrual Cycle:  LMP: 11/25/2020  Menses have been normal up until last menses 2 months ago  Currently has bloating, cramping, like she is going to get her menses  Denies any night sweats, mood swings  Does have occasional hot flash  Hx of infertility IVF pregnancies  OB History    G 2 P 2   Contraception: None, hx of infertility  Practices monthly SBEs, no breast complaints today     Last Mammogram 4/10/2018 [facility] Colonoscopy 9/24/2015 due 2025  Denies any bowel or bladder issues  Pt follows with PCP for regular check-ups and blood work  Review of Systems   All other systems reviewed and are negative  The following portions of the patient's history were reviewed and updated as appropriate: allergies, current medications, past family history, past medical history, past social history, past surgical history and problem list     Past Medical History:   Diagnosis Date    Cancer ([address] )     thyroid    Disease of thyroid gland     Urinary tract infection        Past Surgical History:   Procedure Laterality Date    COLONOSCOPY  2016    FL INJECTION RIGHT HIP (ARTHROGRAM)  9/18/2018    SALPINGECTOMY Right     THYROIDECTOMY      WISDOM TOOTH EXTRACTION         Family History   Problem Relation Age of Onset    No Known Problems Mother     No Known Problems Father     Diabetes Brother        Social History     Socioeconomic History    Marital status: /Civil Union     Spouse name: Not on file    Number of children: Not on file    Years of education: Not on file    Highest education level: Not on file   Occupational History    Not on file   Social Needs    Financial resource strain: Not on file    Food insecurity     Worry: Not on file     Inability: Not on file    Transportation needs     Medical: Not on file     Non-medical: Not on file   Tobacco Use    Smoking status: Never Smoker    Smokeless tobacco: Never Used   Substance and Sexual Activity    Alcohol use:  Yes     Alcohol/week: 3 0 standard drinks     Types: 3 Glasses of wine per week     Frequency: 2-4 times a month     Comment: occasional    Drug use: No    Sexual activity: Yes   Lifestyle    Physical activity     Days per week: Not on file     Minutes per session: Not on file    Stress: Not on file   Relationships    Social connections     Talks on phone: Not on file     Gets together: Not on file     Attends Restorationism service: Not on file     Active member of club or organization: Not on file     Attends meetings of clubs or organizations: Not on file     Relationship status: Not on file    Intimate partner violence     Fear of current or ex partner: Not on file     Emotionally abused: Not on file     Physically abused: Not on file     Forced sexual activity: Not on file   Other Topics Concern    Not on file   Social History Narrative    Not on file         Current Outpatient Medications:     levothyroxine 137 mcg tablet, Take 137 mcg by mouth, Disp: , Rfl:     fluticasone (FLONASE) 50 mcg/act nasal spray, 2 sprays into each nostril daily (Patient not taking: Reported on 2/11/2021), Disp: 16 g, Rfl: 0    nystatin-triamcinolone (MYCOLOG-II) cream, as needed, Disp: , Rfl:     Allergies   Allergen Reactions    Oxycodone GI Intolerance     (pain medications in general)       Objective   Vitals:    02/11/21 0838   BP: 130/80   BP Location: Right arm   Patient Position: Sitting   Cuff Size: Standard   Weight: 59 1 kg (130 lb 3 2 oz)   Height: 5' 4" (1 626 m)     Physical Exam  Vitals signs and nursing note reviewed  Constitutional:       Appearance: She is well-developed  HENT:      Head: Normocephalic  Neck:      Musculoskeletal: Normal range of motion and neck supple  Thyroid: No thyromegaly  Trachea: No tracheal deviation  Cardiovascular:      Rate and Rhythm: Normal rate and regular rhythm  Heart sounds: Normal heart sounds  Pulmonary:      Effort: Pulmonary effort is normal       Breath sounds: Normal breath sounds  Chest:      Breasts: Breasts are symmetrical          Right: No inverted nipple, mass, nipple discharge, skin change or tenderness  Left: No inverted nipple, mass, nipple discharge, skin change or tenderness  Abdominal:      General: Bowel sounds are normal  There is no distension  Palpations: Abdomen is soft  There is no mass  Tenderness: There is no abdominal tenderness   There is no guarding or rebound  Genitourinary:     Labia:         Right: No rash, tenderness, lesion or injury  Left: No rash, tenderness, lesion or injury  Vagina: Normal       Cervix: Normal       Uterus: Normal        Adnexa: Right adnexa normal and left adnexa normal         Right: No mass, tenderness or fullness  Left: No mass, tenderness or fullness  Musculoskeletal: Normal range of motion  Skin:     General: Skin is warm and dry  Neurological:      Mental Status: She is alert and oriented to person, place, and time  Psychiatric:         Behavior: Behavior normal          Thought Content:  Thought content normal          Judgment: Judgment normal

## 2021-02-15 DIAGNOSIS — Z12.11 SPECIAL SCREENING FOR MALIGNANT NEOPLASMS, COLON: Primary | ICD-10-CM

## 2021-02-22 ENCOUNTER — OFFICE VISIT (OUTPATIENT)
Dept: URGENT CARE | Facility: CLINIC | Age: 56
End: 2021-02-22

## 2021-02-22 VITALS
DIASTOLIC BLOOD PRESSURE: 79 MMHG | RESPIRATION RATE: 16 BRPM | HEIGHT: 64 IN | WEIGHT: 130 LBS | SYSTOLIC BLOOD PRESSURE: 145 MMHG | OXYGEN SATURATION: 100 % | TEMPERATURE: 97 F | BODY MASS INDEX: 22.2 KG/M2 | HEART RATE: 61 BPM
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
## 2021-02-22 NOTE — PROGRESS NOTES
[facility]        NAME: Nora Armas is a 54 y o  female  : 1965    MRN: [id number]  DATE: 2021  TIME: 2:09 PM    Assessment and Plan   Cat scratch [W55 [id number]]  1  Cat scratch           Patient Instructions     Patient Instructions   No signs of infection or allergic reaction today  Can apply benadryl cream to the area if it becomes itchy  Monitor for swelling, redness or warmth around the area  Can take an antihistamine such as claritin or zyrtec for any daily allergies  Follow up with PCP in 3-5 days  Proceed to  ER if symptoms worsen  Chief Complaint   No chief complaint on file  History of Present Illness       53 y/o female who was scratched by a cat on her right palm just prior to arrival  Pt was concerned because she is allergic and noticed the area was red and itchy  The cat is UTD on vaccines and is an indoor only cat  Pt does not have SOB or fever  Review of Systems   Review of Systems   Constitutional: Negative for fever  Respiratory: Negative for cough and shortness of breath  Cardiovascular: Negative for chest pain and palpitations  Skin: Positive for wound           Current Medications       Current Outpatient Medications:     fluticasone (FLONASE) 50 mcg/act nasal spray, 2 sprays into each nostril daily (Patient not taking: Reported on 2021), Disp: 16 g, Rfl: 0    levothyroxine 137 mcg tablet, Take 137 mcg by mouth, Disp: , Rfl:     nystatin-triamcinolone (MYCOLOG-II) cream, as needed, Disp: , Rfl:     Current Allergies     Allergies as of 2021 - Reviewed 2021   Allergen Reaction Noted    Oxycodone GI Intolerance 2018            The following portions of the patient's history were reviewed and updated as appropriate: allergies, current medications, past family history, past medical history, past social history, past surgical history and problem list      Past Medical History:   Diagnosis Date    Cancer ([facility] [address] ) thyroid    Disease of thyroid gland     Urinary tract infection        Past Surgical History:   Procedure Laterality Date    COLONOSCOPY  2016    FL INJECTION RIGHT HIP (ARTHROGRAM)  9/18/2018    SALPINGECTOMY Right     THYROIDECTOMY      WISDOM TOOTH EXTRACTION         Family History   Problem Relation Age of Onset    No Known Problems Mother     No Known Problems Father     Diabetes Brother          Medications have been verified  Objective   /79   Pulse 61   Temp (!) 97 °F (36 1 °C)   Resp 16   Ht 5' 4" (1 626 m)   Wt 59 kg (130 lb)   SpO2 100%   BMI 22 31 kg/m²        Physical Exam     Physical Exam  Vitals signs and nursing note reviewed  Constitutional:       Appearance: Normal appearance  Cardiovascular:      Rate and Rhythm: Normal rate and regular rhythm  Pulses: Normal pulses  Heart sounds: Normal heart sounds  No murmur  Pulmonary:      Effort: Pulmonary effort is normal  No respiratory distress  Breath sounds: Normal breath sounds  No wheezing  Skin:     Capillary Refill: Capillary refill takes 2 to 3 seconds  Findings: Wound present  Comments: Pinpoint abrasion noted on right palm, no erythema, streaking, warmth or swelling noted around the area  Neurological:      Mental Status: She is alert and oriented to person, place, and time

## 2021-02-22 NOTE — PATIENT INSTRUCTIONS
No signs of infection or allergic reaction today  Can apply benadryl cream to the area if it becomes itchy  Monitor for swelling, redness or warmth around the area  Can take an antihistamine such as claritin or zyrtec for any daily allergies

## 2021-03-16 ENCOUNTER — OFFICE VISIT (OUTPATIENT)
Dept: FAMILY MEDICINE CLINIC | Facility: CLINIC | Age: 56
End: 2021-03-16
Payer: COMMERCIAL

## 2021-03-16 VITALS
BODY MASS INDEX: 22.2 KG/M2 | RESPIRATION RATE: 16 BRPM | SYSTOLIC BLOOD PRESSURE: 110 MMHG | WEIGHT: 130 LBS | TEMPERATURE: 96.8 F | HEART RATE: 72 BPM | HEIGHT: 64 IN | DIASTOLIC BLOOD PRESSURE: 70 MMHG

## 2021-03-16 DIAGNOSIS — E89.0 POSTOPERATIVE HYPOTHYROIDISM: ICD-10-CM

## 2021-03-16 DIAGNOSIS — E78.2 MIXED HYPERLIPIDEMIA: Primary | ICD-10-CM

## 2021-03-16 PROCEDURE — 3008F BODY MASS INDEX DOCD: CPT | Performed by: NURSE PRACTITIONER

## 2021-03-16 PROCEDURE — 99213 OFFICE O/P EST LOW 20 MIN: CPT | Performed by: NURSE PRACTITIONER

## 2021-03-16 PROCEDURE — 1036F TOBACCO NON-USER: CPT | Performed by: NURSE PRACTITIONER

## 2021-03-16 NOTE — ASSESSMENT & PLAN NOTE
, HDL 86, and triglycerides 85  Strongly encouraged pt consider statin, given family history  Also discussed the role of CT coronary calcium score to further stratify risk  Pt to consider and we will f/u with results

## 2021-03-16 NOTE — PROGRESS NOTES
Assessment/Plan:    1  Mixed hyperlipidemia  Assessment & Plan:  , HDL 86, and triglycerides 85  Strongly encouraged pt consider statin, given family history  Also discussed the role of CT coronary calcium score to further stratify risk  Pt to consider and we will f/u with results  Orders:  -     CT coronary calcium score; Future; Expected date: 03/16/2021    2  Postoperative hypothyroidism  Assessment & Plan:  Had labs done within the past couple of weeks at [facility], awaiting results  There are no Patient Instructions on file for this visit  No follow-ups on file  Subjective:      Patient ID: Bibi Causey is a 54 y o  female  Chief Complaint   Patient presents with    review lab work     pts lost mother recently and has been anxious about her health  ac/cma       Pt here today to review labs  Her mother suddenly passed away at the age of 68 last month, presumed MI  She had a history of HTN and hyperlipidemia, but was not on a lot of medication  Pt has [facility] screening done last week, would like to review her lipid panel  Saw endo in October, has been maintained on her current dose of Levothyroxine, labs recently drawn  Perimenopausal, LMP in October  The following portions of the patient's history were reviewed and updated as appropriate: allergies, current medications, past family history, past medical history, past social history, past surgical history and problem list     Review of Systems   Constitutional: Negative  Respiratory: Negative  Cardiovascular: Negative  Gastrointestinal: Negative  Neurological: Negative  Current Outpatient Medications   Medication Sig Dispense Refill    levothyroxine 137 mcg tablet Take 137 mcg by mouth       No current facility-administered medications for this visit          Objective:    /70   Pulse 72   Temp (!) 96 8 °F (36 °C)   Resp 16   Ht 5' 4" (1 626 m)   Wt 59 kg (130 lb)   BMI 22 31 kg/m² Physical Exam  Vitals signs and nursing note reviewed  Constitutional:       Appearance: Normal appearance  She is well-developed  HENT:      Head: Normocephalic and atraumatic  Cardiovascular:      Rate and Rhythm: Normal rate and regular rhythm  Pulses: Normal pulses  Heart sounds: Normal heart sounds  No murmur  Pulmonary:      Effort: Pulmonary effort is normal       Breath sounds: Normal breath sounds  Skin:     General: Skin is warm and dry  Neurological:      Mental Status: She is alert     Psychiatric:         Mood and Affect: Mood normal          Behavior: Behavior normal                 NATHALIA Gardner

## 2021-07-14 ENCOUNTER — OFFICE VISIT (OUTPATIENT)
Dept: URGENT CARE | Facility: CLINIC | Age: 56
End: 2021-07-14
Payer: COMMERCIAL

## 2021-07-14 VITALS
BODY MASS INDEX: 21.51 KG/M2 | HEIGHT: 64 IN | HEART RATE: 68 BPM | RESPIRATION RATE: 18 BRPM | WEIGHT: 126 LBS | OXYGEN SATURATION: 100 % | TEMPERATURE: 97 F | DIASTOLIC BLOOD PRESSURE: 75 MMHG | SYSTOLIC BLOOD PRESSURE: 152 MMHG

## 2021-07-14 DIAGNOSIS — N30.01 ACUTE CYSTITIS WITH HEMATURIA: Primary | ICD-10-CM

## 2021-07-14 PROCEDURE — 87186 SC STD MICRODIL/AGAR DIL: CPT | Performed by: PHYSICIAN ASSISTANT

## 2021-07-14 PROCEDURE — 87086 URINE CULTURE/COLONY COUNT: CPT | Performed by: PHYSICIAN ASSISTANT

## 2021-07-14 PROCEDURE — 99213 OFFICE O/P EST LOW 20 MIN: CPT | Performed by: PHYSICIAN ASSISTANT

## 2021-07-14 PROCEDURE — 87077 CULTURE AEROBIC IDENTIFY: CPT | Performed by: PHYSICIAN ASSISTANT

## 2021-07-14 RX ORDER — SULFAMETHOXAZOLE AND TRIMETHOPRIM 800; 160 MG/1; MG/1
1 TABLET ORAL EVERY 12 HOURS SCHEDULED
Qty: 10 TABLET | Refills: 0 | Status: SHIPPED | OUTPATIENT
Start: 2021-07-14 | End: 2021-07-19

## 2021-07-14 NOTE — PROGRESS NOTES
[facility]        NAME: Gopi Romo is a 64 y o  female  : 1965    MRN: [id number]  DATE: 2021  TIME: 6:00 PM    Assessment and Plan   Acute cystitis with hematuria [N30 01]  1  Acute cystitis with hematuria  sulfamethoxazole-trimethoprim (BACTRIM DS) 800-160 mg per tablet    Urine culture         Patient Instructions     Patient Instructions   Urine today showed signs of infection  Will prescribe an antibiotic  Finish the entire dose of antibiotics even if feeling better  Can take over the counter UTI symptom relief such as AZO if you find it helpful  Increase the amount of fluids you are drinking over the next week  If you experience increased abdominal or back pain, nausea, vomiting or fever please go to the ER  Follow up with primary care doctor if needed for frequent symptoms  Follow up with PCP in 3-5 days  Proceed to  ER if symptoms worsen  Chief Complaint     Chief Complaint   Patient presents with    Possible UTI     symptoms x 2 days burnng, frequency         History of Present Illness        70-year-old female presents with 2 days of burning, frequency, urgency with urination  Has been taking azo x2 days  No fevers, nausea, abdominal pain, back pain  Or hematuria  Review of Systems   Review of Systems   Constitutional: Negative for fever  Respiratory: Negative for shortness of breath  Cardiovascular: Negative for chest pain  Gastrointestinal: Negative for abdominal pain, nausea and vomiting  Genitourinary: Positive for dysuria, frequency and urgency  Negative for flank pain and hematuria           Current Medications       Current Outpatient Medications:     levothyroxine 137 mcg tablet, Take 137 mcg by mouth, Disp: , Rfl:     sulfamethoxazole-trimethoprim (BACTRIM DS) 800-160 mg per tablet, Take 1 tablet by mouth every 12 (twelve) hours for 5 days, Disp: 10 tablet, Rfl: 0    Current Allergies     Allergies as of 2021 - Reviewed 2021 Allergen Reaction Noted    Oxycodone GI Intolerance 09/07/2018            The following portions of the patient's history were reviewed and updated as appropriate: allergies, current medications, past family history, past medical history, past social history, past surgical history and problem list      Past Medical History:   Diagnosis Date    Cancer ([address] )     thyroid    Disease of thyroid gland     Urinary tract infection        Past Surgical History:   Procedure Laterality Date    COLONOSCOPY  2016    FL INJECTION RIGHT HIP (ARTHROGRAM)  9/18/2018    SALPINGECTOMY Right     THYROIDECTOMY      WISDOM TOOTH EXTRACTION         Family History   Problem Relation Age of Onset    No Known Problems Mother     No Known Problems Father     Diabetes Brother          Medications have been verified  Objective   /75   Pulse 68   Temp (!) 97 °F (36 1 °C)   Resp 18   Ht 5' 4" (1 626 m)   Wt 57 2 kg (126 lb)   LMP  (LMP Unknown)   SpO2 100%   BMI 21 63 kg/m²        Physical Exam     Physical Exam  Vitals and nursing note reviewed  Constitutional:       Appearance: Normal appearance  Cardiovascular:      Rate and Rhythm: Normal rate and regular rhythm  Pulses: Normal pulses  Heart sounds: Normal heart sounds  No murmur heard  Pulmonary:      Effort: Pulmonary effort is normal  No respiratory distress  Breath sounds: Normal breath sounds  Abdominal:      General: Abdomen is flat  Bowel sounds are normal  There is no distension  Palpations: Abdomen is soft  Tenderness: There is no abdominal tenderness  There is no right CVA tenderness or left CVA tenderness  Skin:     General: Skin is warm  Neurological:      Mental Status: She is alert and oriented to person, place, and time     Psychiatric:         Behavior: Behavior normal

## 2021-07-16 LAB — BACTERIA UR CULT: ABNORMAL

## 2021-08-26 ENCOUNTER — TELEPHONE (OUTPATIENT)
Dept: GASTROENTEROLOGY | Facility: CLINIC | Age: 56
End: 2021-08-26

## 2021-08-26 NOTE — TELEPHONE ENCOUNTER
Recall call went out via [employer] in 2020 with no return calls from pt to schedule  Pt is due for a recall colon with Dr Shahab Rueda for hx of polyps  I called and spoke to  whom took message and will have pt call us  Will call pt again in two weeks if do not hear back from her

## 2021-09-02 NOTE — TELEPHONE ENCOUNTER
Pt lmom returning call and asked for a call back at phone number [phone]  I did call her on that number, however, there was a message stating the phone number that you reached may have been disconnected and went on to say something about medicare  I returned pt's call on her home number [phone], lmom asking her to please call back  Will call her again in two weeks if do not hear back from her

## 2021-09-14 ENCOUNTER — TELEPHONE (OUTPATIENT)
Dept: OBGYN CLINIC | Facility: CLINIC | Age: 56
End: 2021-09-14

## 2021-09-14 NOTE — TELEPHONE ENCOUNTER
T/c from patient c/o a growth /lump in her vagina , not paiful at this time but bothersome and would like to have it checked out asap  Appointment scheduled

## 2021-09-15 ENCOUNTER — OFFICE VISIT (OUTPATIENT)
Dept: OBGYN CLINIC | Facility: CLINIC | Age: 56
End: 2021-09-15
Payer: COMMERCIAL

## 2021-09-15 VITALS
DIASTOLIC BLOOD PRESSURE: 84 MMHG | BODY MASS INDEX: 22.23 KG/M2 | WEIGHT: 130.2 LBS | HEIGHT: 64 IN | SYSTOLIC BLOOD PRESSURE: 138 MMHG

## 2021-09-15 DIAGNOSIS — R10.2 PELVIC PAIN: ICD-10-CM

## 2021-09-15 DIAGNOSIS — N81.4 UTERINE PROLAPSE: Primary | ICD-10-CM

## 2021-09-15 PROBLEM — S90.121A CONTUSION OF LESSER TOE OF RIGHT FOOT WITHOUT DAMAGE TO NAIL: Status: ACTIVE | Noted: 2018-07-29

## 2021-09-15 PROBLEM — I65.23 BILATERAL CAROTID ARTERY STENOSIS: Status: ACTIVE | Noted: 2021-07-14

## 2021-09-15 PROBLEM — I10 ESSENTIAL HYPERTENSION: Status: ACTIVE | Noted: 2021-07-14

## 2021-09-15 PROBLEM — E78.00 PURE HYPERCHOLESTEROLEMIA: Status: ACTIVE | Noted: 2021-07-14

## 2021-09-15 PROCEDURE — 99213 OFFICE O/P EST LOW 20 MIN: CPT | Performed by: PHYSICIAN ASSISTANT

## 2021-09-15 RX ORDER — LEVOTHYROXINE SODIUM 150 UG/1
TABLET ORAL
COMMUNITY
Start: 2021-07-14

## 2021-09-22 ENCOUNTER — VBI (OUTPATIENT)
Dept: ADMINISTRATIVE | Facility: OTHER | Age: 56
End: 2021-09-22

## 2021-10-06 ENCOUNTER — OFFICE VISIT (OUTPATIENT)
Dept: FAMILY MEDICINE CLINIC | Facility: CLINIC | Age: 56
End: 2021-10-06
Payer: COMMERCIAL

## 2021-10-06 VITALS
DIASTOLIC BLOOD PRESSURE: 70 MMHG | HEIGHT: 64 IN | WEIGHT: 130 LBS | TEMPERATURE: 97.2 F | BODY MASS INDEX: 22.2 KG/M2 | HEART RATE: 60 BPM | SYSTOLIC BLOOD PRESSURE: 126 MMHG | RESPIRATION RATE: 18 BRPM

## 2021-10-06 DIAGNOSIS — R39.9 UTI SYMPTOMS: Primary | ICD-10-CM

## 2021-10-06 LAB
SL AMB  POCT GLUCOSE, UA: ABNORMAL
SL AMB LEUKOCYTE ESTERASE,UA: ABNORMAL
SL AMB POCT BILIRUBIN,UA: ABNORMAL
SL AMB POCT BLOOD,UA: ABNORMAL
SL AMB POCT CLARITY,UA: CLEAR
SL AMB POCT COLOR,UA: YELLOW
SL AMB POCT KETONES,UA: ABNORMAL
SL AMB POCT NITRITE,UA: ABNORMAL
SL AMB POCT PH,UA: 5.5
SL AMB POCT SPECIFIC GRAVITY,UA: 1.01
SL AMB POCT URINE PROTEIN: ABNORMAL
SL AMB POCT UROBILINOGEN: 0.2

## 2021-10-06 PROCEDURE — 3008F BODY MASS INDEX DOCD: CPT | Performed by: NURSE PRACTITIONER

## 2021-10-06 PROCEDURE — 1036F TOBACCO NON-USER: CPT | Performed by: NURSE PRACTITIONER

## 2021-10-06 PROCEDURE — 81003 URINALYSIS AUTO W/O SCOPE: CPT | Performed by: NURSE PRACTITIONER

## 2021-10-06 PROCEDURE — 3725F SCREEN DEPRESSION PERFORMED: CPT | Performed by: NURSE PRACTITIONER

## 2021-10-06 PROCEDURE — 99213 OFFICE O/P EST LOW 20 MIN: CPT | Performed by: NURSE PRACTITIONER

## 2021-10-06 RX ORDER — SULFAMETHOXAZOLE AND TRIMETHOPRIM 800; 160 MG/1; MG/1
1 TABLET ORAL EVERY 12 HOURS SCHEDULED
Qty: 10 TABLET | Refills: 0 | Status: SHIPPED | OUTPATIENT
Start: 2021-10-06 | End: 2021-10-11

## 2021-10-20 ENCOUNTER — HOSPITAL ENCOUNTER (OUTPATIENT)
Dept: ULTRASOUND IMAGING | Facility: HOSPITAL | Age: 56
Discharge: HOME | End: 2021-10-20
Payer: COMMERCIAL

## 2021-10-20 DIAGNOSIS — R10.2 PELVIC PAIN: ICD-10-CM

## 2021-10-20 DIAGNOSIS — N81.4 UTERINE PROLAPSE: ICD-10-CM

## 2021-10-20 DIAGNOSIS — N83.8 OVARIAN MASS, LEFT: Primary | ICD-10-CM

## 2021-10-20 PROCEDURE — 76830 TRANSVAGINAL US NON-OB: CPT

## 2021-10-20 PROCEDURE — 76856 US EXAM PELVIC COMPLETE: CPT

## 2021-10-25 ENCOUNTER — TELEPHONE (OUTPATIENT)
Dept: OBGYN CLINIC | Facility: CLINIC | Age: 56
End: 2021-10-25

## 2021-11-20 ENCOUNTER — HOSPITAL ENCOUNTER (OUTPATIENT)
Dept: RADIOLOGY | Facility: HOSPITAL | Age: 56
Discharge: HOME | End: 2021-11-20
Attending: PHYSICIAN ASSISTANT
Payer: COMMERCIAL

## 2021-11-20 DIAGNOSIS — N83.8 OVARIAN MASS, LEFT: ICD-10-CM

## 2021-11-20 PROCEDURE — A9585 GADOBUTROL INJECTION: HCPCS | Performed by: PHYSICIAN ASSISTANT

## 2021-11-20 PROCEDURE — G1004 CDSM NDSC: HCPCS

## 2021-11-20 PROCEDURE — 72197 MRI PELVIS W/O & W/DYE: CPT

## 2021-11-20 RX ADMIN — GADOBUTROL 6 ML: 604.72 INJECTION INTRAVENOUS at 09:45

## 2021-11-29 ENCOUNTER — HOSPITAL ENCOUNTER (OUTPATIENT)
Dept: CT IMAGING | Facility: HOSPITAL | Age: 56
Discharge: HOME | End: 2021-11-29
Payer: COMMERCIAL

## 2021-11-29 DIAGNOSIS — E78.2 MIXED HYPERLIPIDEMIA: ICD-10-CM

## 2021-11-29 PROCEDURE — G1004 CDSM NDSC: HCPCS

## 2021-11-29 PROCEDURE — 75571 CT HRT W/O DYE W/CA TEST: CPT

## 2021-12-02 DIAGNOSIS — N81.4 UTERINE PROLAPSE: ICD-10-CM

## 2021-12-02 DIAGNOSIS — N80.9 ENDOMETRIOSIS: Primary | ICD-10-CM

## 2021-12-03 ENCOUNTER — TELEPHONE (OUTPATIENT)
Dept: GASTROENTEROLOGY | Facility: CLINIC | Age: 56
End: 2021-12-03

## 2021-12-06 ENCOUNTER — ANESTHESIA EVENT (OUTPATIENT)
Dept: GASTROENTEROLOGY | Facility: AMBULATORY SURGERY CENTER | Age: 56
End: 2021-12-06

## 2021-12-06 ENCOUNTER — ANESTHESIA (OUTPATIENT)
Dept: GASTROENTEROLOGY | Facility: AMBULATORY SURGERY CENTER | Age: 56
End: 2021-12-06

## 2021-12-06 ENCOUNTER — HOSPITAL ENCOUNTER (OUTPATIENT)
Dept: GASTROENTEROLOGY | Facility: AMBULATORY SURGERY CENTER | Age: 56
Discharge: HOME | End: 2021-12-06
Payer: COMMERCIAL

## 2021-12-06 VITALS
TEMPERATURE: 97.5 F | HEIGHT: 63 IN | SYSTOLIC BLOOD PRESSURE: 116 MMHG | WEIGHT: 122 LBS | DIASTOLIC BLOOD PRESSURE: 79 MMHG | RESPIRATION RATE: 18 BRPM | BODY MASS INDEX: 21.62 KG/M2 | HEART RATE: 62 BPM | OXYGEN SATURATION: 98 %

## 2021-12-06 DIAGNOSIS — Z86.010 HX OF COLONIC POLYPS: ICD-10-CM

## 2021-12-06 LAB
EXT PREGNANCY TEST URINE: NEGATIVE
EXT. CONTROL: NORMAL

## 2021-12-06 PROCEDURE — 45378 DIAGNOSTIC COLONOSCOPY: CPT | Performed by: INTERNAL MEDICINE

## 2021-12-06 PROCEDURE — 00812 ANES LWR INTST SCR COLSC: CPT | Performed by: NURSE ANESTHETIST, CERTIFIED REGISTERED

## 2021-12-06 RX ORDER — SODIUM CHLORIDE 9 MG/ML
20 INJECTION, SOLUTION INTRAVENOUS CONTINUOUS
Status: DISCONTINUED | OUTPATIENT
Start: 2021-12-06 | End: 2021-12-10 | Stop reason: HOSPADM

## 2021-12-06 RX ORDER — SODIUM CHLORIDE 9 MG/ML
INJECTION, SOLUTION INTRAVENOUS CONTINUOUS PRN
Status: DISCONTINUED | OUTPATIENT
Start: 2021-12-06 | End: 2021-12-06

## 2021-12-06 RX ORDER — PROPOFOL 10 MG/ML
INJECTION, EMULSION INTRAVENOUS AS NEEDED
Status: DISCONTINUED | OUTPATIENT
Start: 2021-12-06 | End: 2021-12-06

## 2021-12-06 RX ADMIN — PROPOFOL 50 MG: 10 INJECTION, EMULSION INTRAVENOUS at 08:54

## 2021-12-06 RX ADMIN — PROPOFOL 100 MG: 10 INJECTION, EMULSION INTRAVENOUS at 08:48

## 2021-12-06 RX ADMIN — SODIUM CHLORIDE: 9 INJECTION, SOLUTION INTRAVENOUS at 08:42

## 2021-12-06 RX ADMIN — PROPOFOL 100 MG: 10 INJECTION, EMULSION INTRAVENOUS at 08:43

## 2022-02-15 ENCOUNTER — ANNUAL EXAM (OUTPATIENT)
Dept: OBGYN CLINIC | Facility: CLINIC | Age: 57
End: 2022-02-15
Payer: COMMERCIAL

## 2022-02-15 VITALS — BODY MASS INDEX: 22.67 KG/M2 | SYSTOLIC BLOOD PRESSURE: 132 MMHG | DIASTOLIC BLOOD PRESSURE: 88 MMHG | WEIGHT: 128 LBS

## 2022-02-15 DIAGNOSIS — Z12.31 BREAST CANCER SCREENING BY MAMMOGRAM: ICD-10-CM

## 2022-02-15 DIAGNOSIS — Z01.419 ENCNTR FOR GYN EXAM (GENERAL) (ROUTINE) W/O ABN FINDINGS: Primary | ICD-10-CM

## 2022-02-15 DIAGNOSIS — N95.1 MENOPAUSAL SYMPTOM: ICD-10-CM

## 2022-02-15 PROCEDURE — 1036F TOBACCO NON-USER: CPT | Performed by: NURSE PRACTITIONER

## 2022-02-15 PROCEDURE — 99396 PREV VISIT EST AGE 40-64: CPT | Performed by: NURSE PRACTITIONER

## 2022-02-15 NOTE — PROGRESS NOTES
Assessment/Plan   Diagnoses and all orders for this visit:    Encntr for gyn exam (general) (routine) w/o abn findings    Breast cancer screening by mammogram  -     Mammo screening bilateral w 3d & cad; Future    Menopausal symptom        Discussion    Reviewed normal exam today  Pap deferred today  Reviewed uterine prolapse  Can to PT to help, pessary or hysterectomy  Does not bother her at the moment, will continue to monitor  Call office if she would like to further discuss pessary or hysterectomy  Reviewed options to help with menopausal symptoms  Discussed herbal remedies, avoiding triggering foods/drink, exercise, and HRT  Reviewed risk of HRT with patient including increased risk of DVT, PE, MI, stroke, increased risk of breast cancer  Also reviewed common symptoms with HRT  Pt asking about bio-identical hormones reviewed where to obtain and educated on use  She would like to try OTC remedies first, recommend viewing website [employer]  [employer] which is herbal supplements to help with menopausal symptoms  Would like rx for HRT in case, will look into cost    Normal breast exam today  Monthly SBEs advised  Mammograms yearly  Rx for mammogram given  Encourage at least 1200 mg calcium citrate + 2000 IUs vitamin D3 divided through diet and supplement throughout the day  Encourage 30-40 min weight bearing exercise most days of week  Colon cancer screening with a colonoscopy is up to date  I have reviewed the patient's risk factors for osteoporosis and ordered a dexa scan  Pt to check with insurance for coverage  All questions have been answered to her satisfaction  RTO for annual or sooner if needed    Subjective     Christy Allen is a 64 y o  female who presents for annual well woman exam    Last exam 2/11/2021 Pap 9/28/2018 Normal HPV negative Pap deferred today  Pt denies any abnormal vaginal discharge, itching, or odor    Pt in a mutually exclusive relationship () with a male partner and denies the need for STD testing today  Was noted to have prolapse at previous appt  States pelvic pain and other symptoms resolved when she had a menses  Denies any current pain  Did not do physical therapy since pain resolved  Menstrual Cycle:  LMP: Shandra-menopausal   12/3/2021  Previous menses August   Horrible hot flashes and night sweats  Waking her up several times a night  Would like to discuss options to help with menopausal complaints  OB History     G 2 P 2   Practices monthly SBEs, no breast complaints today  Last Mammogram 2018 [facility]   Colonoscopy ue back in 5 years  Denies any bowel or bladder issues  Pt follows with PCP for regular check-ups and blood work  One daughter at school in [address] [place] [address]  Other daughter moving to [place] with friend to finish out high school  Review of Systems   All other systems reviewed and are negative      The following portions of the patient's history were reviewed and updated as appropriate: allergies, current medications, past family history, past medical history, past social history, past surgical history and problem list     Past Medical History:   Diagnosis Date    Cancer ([facility] [address] )     thyroid    Disease of thyroid gland     Urinary tract infection        Past Surgical History:   Procedure Laterality Date    COLONOSCOPY      FL INJECTION RIGHT HIP (ARTHROGRAM)  2018    SALPINGECTOMY Right     one tube removed    THYROIDECTOMY      WISDOM TOOTH EXTRACTION         Family History   Problem Relation Age of Onset    No Known Problems Mother     No Known Problems Father     Diabetes Brother     Colon cancer Paternal Grandfather         age [de-identified];  age 80       Social History     Socioeconomic History    Marital status: /Civil Union     Spouse name: Not on file    Number of children: Not on file    Years of education: Not on file    Highest education level: Not on file   Occupational History    Not on file Tobacco Use    Smoking status: Never Smoker    Smokeless tobacco: Never Used   Vaping Use    Vaping Use: Never used   Substance and Sexual Activity    Alcohol use: Yes     Alcohol/week: 3 0 standard drinks     Types: 3 Glasses of wine per week     Comment: occasional    Drug use: No    Sexual activity: Yes     Partners: Male   Other Topics Concern    Not on file   Social History Narrative    Not on file     Social Determinants of Health     Financial Resource Strain: Not on file   Food Insecurity: Not on file   Transportation Needs: Not on file   Physical Activity: Not on file   Stress: Not on file   Social Connections: Not on file   Intimate Partner Violence: Not on file   Housing Stability: Not on file         Current Outpatient Medications:     Ascorbic Acid (VITAMIN C PO), Take by mouth, Disp: , Rfl:     Cholecalciferol (VITAMIN D-3 PO), Take by mouth, Disp: , Rfl:     Synthroid 150 MCG tablet, , Disp: , Rfl:     Allergies   Allergen Reactions    Oxycodone GI Intolerance     (pain medications in general)       Objective   Vitals:    02/15/22 0831   BP: 132/88   BP Location: Right arm   Patient Position: Sitting   Cuff Size: Standard   Weight: 58 1 kg (128 lb)     Physical Exam  Vitals and nursing note reviewed  Constitutional:       Appearance: She is well-developed  HENT:      Head: Normocephalic  Neck:      Thyroid: No thyromegaly  Trachea: No tracheal deviation  Cardiovascular:      Rate and Rhythm: Normal rate and regular rhythm  Heart sounds: Normal heart sounds  Pulmonary:      Effort: Pulmonary effort is normal       Breath sounds: Normal breath sounds  Chest:   Breasts: Breasts are symmetrical       Right: No inverted nipple, mass, nipple discharge, skin change or tenderness  Left: No inverted nipple, mass, nipple discharge, skin change or tenderness  Abdominal:      General: Bowel sounds are normal  There is no distension  Palpations: Abdomen is soft  There is no mass  Tenderness: There is no abdominal tenderness  There is no guarding or rebound  Genitourinary:     Labia:         Right: No rash, tenderness, lesion or injury  Left: No rash, tenderness, lesion or injury  Vagina: Normal       Cervix: Normal       Uterus: Normal  With uterine prolapse  Not deviated, not enlarged, not fixed and not tender  Adnexa: Right adnexa normal and left adnexa normal         Right: No mass, tenderness or fullness  Left: No mass, tenderness or fullness  Musculoskeletal:         General: Normal range of motion  Cervical back: Normal range of motion and neck supple  Skin:     General: Skin is warm and dry  Neurological:      Mental Status: She is alert and oriented to person, place, and time  Psychiatric:         Behavior: Behavior normal          Thought Content:  Thought content normal          Judgment: Judgment normal

## 2022-08-30 ENCOUNTER — VBI (OUTPATIENT)
Dept: ADMINISTRATIVE | Facility: OTHER | Age: 57
End: 2022-08-30

## 2022-08-30 NOTE — TELEPHONE ENCOUNTER
08/30/22 12:20 PM     See documentation in the VB [employer] SmartForm   [facility]    [facility]

## 2023-01-09 ENCOUNTER — OFFICE VISIT (OUTPATIENT)
Dept: FAMILY MEDICINE CLINIC | Facility: CLINIC | Age: 58
End: 2023-01-09

## 2023-01-09 VITALS
TEMPERATURE: 97 F | WEIGHT: 132 LBS | RESPIRATION RATE: 16 BRPM | BODY MASS INDEX: 22.53 KG/M2 | DIASTOLIC BLOOD PRESSURE: 78 MMHG | SYSTOLIC BLOOD PRESSURE: 128 MMHG | HEIGHT: 64 IN | HEART RATE: 80 BPM

## 2023-01-09 DIAGNOSIS — E78.2 MIXED HYPERLIPIDEMIA: ICD-10-CM

## 2023-01-09 DIAGNOSIS — E89.0 POSTOPERATIVE HYPOTHYROIDISM: ICD-10-CM

## 2023-01-09 DIAGNOSIS — Z01.818 PREOP EXAMINATION: Primary | ICD-10-CM

## 2023-01-09 PROBLEM — S90.121A CONTUSION OF LESSER TOE OF RIGHT FOOT WITHOUT DAMAGE TO NAIL: Status: RESOLVED | Noted: 2018-07-29 | Resolved: 2023-01-09

## 2023-01-09 PROBLEM — I10 ESSENTIAL HYPERTENSION: Status: RESOLVED | Noted: 2021-07-14 | Resolved: 2023-01-09

## 2023-01-09 RX ORDER — LEVOTHYROXINE SODIUM 137 MCG
TABLET ORAL DAILY
COMMUNITY
Start: 2022-11-14

## 2023-01-09 NOTE — PROGRESS NOTES
FAMILY PRACTICE PRE-OPERATIVE EVALUATION  [facility]    NAME: Tiago Saldivar  AGE: 62 y o  SEX: female  : 1965     DATE: 2023    Family Practice Pre-Operative Evaluation      Chief Complaint: Pre-operative Evaluation     Surgery: 23  Anticipated Date of Surgery: blephoroplasty  Surgeon: Dr Dandre Durand      History of Present Illness:     Here today for preop examination  She has been doing well, no complaints or concerns today  Preop labs done, not available to review at time of visit  Anesthesia:  IV sedation  Bleeding Risk: no recent abnormal bleeding, no remote history of abnormal bleeding and no use of Ca-channel blockers  Current Anti-platelet/anticoagulation medication: none    Assessment of Cardiac Risk:  Denies unstable or severe angina or MI in the last 6 weeks or history of stent placement in the last year   Denies decompensated heart failure (e g  New onset heart failure, NYHA functional class IV heart failure, or worsening existing heart failure)  Denies significant arrhythmias such as high grade AV block, symptomatic ventricular arrhythmia, newly recognized ventricular tachycardia, supraventricular tachycardia with resting heart rate >100, or symptomatic bradycardia  Denies severe heart valve disease including aortic stenosis or symptomatic mitral stenosis     Exercise Capacity:  Able to walk 4 blocks without symptoms?: Yes  Able to walk 2 flights without symptoms?: Yes    Prior Anesthesia Reactions: No     Personal history of venous thromboembolic disease? No    History of steroid use for >2 weeks within last year? No         Review of Systems:     Review of Systems   Constitutional: Negative for chills, fatigue and fever  Respiratory: Negative for cough, shortness of breath and wheezing  Cardiovascular: Negative for chest pain, palpitations and leg swelling     Gastrointestinal: Negative for abdominal pain, diarrhea, nausea and vomiting  Skin: Negative for rash  Neurological: Negative for dizziness and headaches  Current Problem List:     Patient Active Problem List   Diagnosis   • Acute esophagitis   • Anemia   • Displacement of intervertebral disc without myelopathy   • Hyperlipidemia   • Postoperative hypothyroidism   • History of malignant neoplasm of thyroid   • Bilateral carotid artery stenosis   • Pure hypercholesterolemia   • Pelvic pain   • Uterine prolapse   • Menopausal symptom       Allergies: Allergies   Allergen Reactions   • Oxycodone GI Intolerance     (pain medications in general)       Current Medications:       Current Outpatient Medications:   •  Cholecalciferol (VITAMIN D-3 PO), Take by mouth in the morning, Disp: , Rfl:   •  Synthroid 137 MCG tablet, Take by mouth daily, Disp: , Rfl:     Past Medical History:       Past Medical History:   Diagnosis Date   • Cancer ([facility] [address] )     thyroid   • Disease of thyroid gland    • Urinary tract infection         Past Surgical History:   Procedure Laterality Date   • COLONOSCOPY     • FL INJECTION RIGHT HIP (ARTHROGRAM)  2018   • SALPINGECTOMY Right     one tube removed   • THYROIDECTOMY     • WISDOM TOOTH EXTRACTION           Family History   Problem Relation Age of Onset   • No Known Problems Mother    • No Known Problems Father    • Diabetes Brother    • Colon cancer Paternal Grandfather         age [de-identified];  age 80        Social History     Socioeconomic History   • Marital status: /Civil Union     Spouse name: Not on file   • Number of children: Not on file   • Years of education: Not on file   • Highest education level: Not on file   Occupational History   • Not on file   Tobacco Use   • Smoking status: Never   • Smokeless tobacco: Never   Vaping Use   • Vaping Use: Never used   Substance and Sexual Activity   • Alcohol use:  Yes     Alcohol/week: 3 0 standard drinks     Types: 3 Glasses of wine per week     Comment: occasional   • Drug use: No   • Sexual activity: Yes     Partners: Male   Other Topics Concern   • Not on file   Social History Narrative   • Not on file     Social Determinants of Health     Financial Resource Strain: Not on file   Food Insecurity: Not on file   Transportation Needs: Not on file   Physical Activity: Not on file   Stress: Not on file   Social Connections: Not on file   Intimate Partner Violence: Not on file   Housing Stability: Not on file        Physical Exam:     /78   Pulse 80   Temp (!) 97 °F (36 1 °C)   Resp 16   Ht 5' 4" (1 626 m)   Wt 59 9 kg (132 lb)   LMP 09/20/2021 (Within Months)   BMI 22 66 kg/m²     Physical Exam  Vitals and nursing note reviewed  Constitutional:       Appearance: Normal appearance  She is well-developed  HENT:      Head: Normocephalic and atraumatic  Right Ear: Tympanic membrane, ear canal and external ear normal       Left Ear: Tympanic membrane, ear canal and external ear normal       Nose: No mucosal edema or rhinorrhea  Mouth/Throat:      Pharynx: Uvula midline  Eyes:      Conjunctiva/sclera: Conjunctivae normal    Neck:      Thyroid: No thyromegaly  Cardiovascular:      Rate and Rhythm: Normal rate and regular rhythm  Pulses: Normal pulses  Heart sounds: Normal heart sounds  No murmur heard  Pulmonary:      Effort: Pulmonary effort is normal       Breath sounds: Normal breath sounds  Abdominal:      General: Bowel sounds are normal  There is no distension  Palpations: There is no hepatomegaly or splenomegaly  Tenderness: There is no abdominal tenderness  Musculoskeletal:      Cervical back: Neck supple  No edema  Lymphadenopathy:      Cervical:      Right cervical: No superficial cervical adenopathy  Left cervical: No superficial cervical adenopathy  Skin:     General: Skin is warm and dry  Capillary Refill: Capillary refill takes less than 2 seconds  Findings: No rash  Neurological:      Mental Status: She is alert  Psychiatric:         Mood and Affect: Mood normal          Behavior: Behavior normal           Data:     Pre-operative work-up    Laboratory Results: not available at time of visit to review  EKG: I have personally reviewed pertinent reports  No results found for this or any previous visit (from the past 672 hour(s))  Assessment & Recommendations:     Problem List Items Addressed This Visit        Endocrine    Postoperative hypothyroidism     Management per endo, labs up to date         Relevant Medications    Synthroid 137 MCG tablet       Other    Hyperlipidemia     Managing with lifestyle changes         Other Visit Diagnoses     Preop examination    -  Primary    Relevant Orders    POCT ECG (Completed)          Pre-Op Evaluation Assessment  62 y o  female with planned surgery: as above  Known risk factors for perioperative complications: None  Current medications which may produce withdrawal symptoms if withheld perioperatively: none  Pre-Op Evaluation Plan  1  Further preoperative workup as follows:   - None; no further preoperative work-up is required    2  Medication Management/Recommendations:   - None, continue medication regimen including morning of surgery, with sip of water  - Patient has been instructed to avoid herbs or non-directed vitamins the week prior to surgery to ensure no drug interactions with perioperative surgical and anesthetic medications  - Patient has been instructed to avoid aspirin containing medications or non-steroidal anti-inflammatory drugs for the week preceding surgery  3  Prophylaxis for cardiac events with perioperative beta-blockers: not indicated  4  Patient requires further consultation with: None    Clearance  Patient is CLEARED for surgery without any additional cardiac testing       [facility]  [address] [place] [id number]  Phone#  [phone]  Fax#  [phone]

## 2023-02-28 ENCOUNTER — VBI (OUTPATIENT)
Dept: ADMINISTRATIVE | Facility: OTHER | Age: 58
End: 2023-02-28

## 2024-03-14 ENCOUNTER — OFFICE VISIT (OUTPATIENT)
Dept: URGENT CARE | Facility: CLINIC | Age: 59
End: 2024-03-14
Payer: COMMERCIAL

## 2024-03-14 VITALS
WEIGHT: 133 LBS | OXYGEN SATURATION: 98 % | HEART RATE: 64 BPM | TEMPERATURE: 96.5 F | DIASTOLIC BLOOD PRESSURE: 82 MMHG | BODY MASS INDEX: 22.83 KG/M2 | RESPIRATION RATE: 14 BRPM | SYSTOLIC BLOOD PRESSURE: 124 MMHG

## 2024-03-14 DIAGNOSIS — N39.0 URINARY TRACT INFECTION WITHOUT HEMATURIA, SITE UNSPECIFIED: Primary | ICD-10-CM

## 2024-03-14 LAB
SL AMB  POCT GLUCOSE, UA: ABNORMAL
SL AMB LEUKOCYTE ESTERASE,UA: ABNORMAL
SL AMB POCT BILIRUBIN,UA: ABNORMAL
SL AMB POCT BLOOD,UA: ABNORMAL
SL AMB POCT CLARITY,UA: ABNORMAL
SL AMB POCT COLOR,UA: ABNORMAL
SL AMB POCT KETONES,UA: ABNORMAL
SL AMB POCT NITRITE,UA: ABNORMAL
SL AMB POCT PH,UA: 6
SL AMB POCT SPECIFIC GRAVITY,UA: 1.02
SL AMB POCT URINE PROTEIN: ABNORMAL
SL AMB POCT UROBILINOGEN: 0.2

## 2024-03-14 PROCEDURE — 81002 URINALYSIS NONAUTO W/O SCOPE: CPT | Performed by: PHYSICIAN ASSISTANT

## 2024-03-14 PROCEDURE — 87077 CULTURE AEROBIC IDENTIFY: CPT | Performed by: PHYSICIAN ASSISTANT

## 2024-03-14 PROCEDURE — 87186 SC STD MICRODIL/AGAR DIL: CPT | Performed by: PHYSICIAN ASSISTANT

## 2024-03-14 PROCEDURE — 99213 OFFICE O/P EST LOW 20 MIN: CPT | Performed by: PHYSICIAN ASSISTANT

## 2024-03-14 PROCEDURE — 87086 URINE CULTURE/COLONY COUNT: CPT | Performed by: PHYSICIAN ASSISTANT

## 2024-03-14 RX ORDER — CEPHALEXIN 500 MG/1
500 CAPSULE ORAL EVERY 12 HOURS SCHEDULED
Qty: 10 CAPSULE | Refills: 0 | Status: SHIPPED | OUTPATIENT
Start: 2024-03-14 | End: 2024-03-19

## 2024-03-16 LAB — BACTERIA UR CULT: ABNORMAL

## 2024-04-29 ENCOUNTER — OFFICE VISIT (OUTPATIENT)
Dept: ENDOCRINOLOGY | Facility: CLINIC | Age: 59
End: 2024-04-29
Payer: COMMERCIAL

## 2024-04-29 VITALS
HEART RATE: 73 BPM | BODY MASS INDEX: 23.42 KG/M2 | WEIGHT: 132.2 LBS | DIASTOLIC BLOOD PRESSURE: 92 MMHG | SYSTOLIC BLOOD PRESSURE: 140 MMHG | HEIGHT: 63 IN

## 2024-04-29 DIAGNOSIS — Z85.850 HISTORY OF MALIGNANT NEOPLASM OF THYROID: Primary | ICD-10-CM

## 2024-04-29 DIAGNOSIS — E89.0 POSTOPERATIVE HYPOTHYROIDISM: ICD-10-CM

## 2024-04-29 DIAGNOSIS — E55.9 VITAMIN D DEFICIENCY: ICD-10-CM

## 2024-04-29 PROCEDURE — 99204 OFFICE O/P NEW MOD 45 MIN: CPT | Performed by: INTERNAL MEDICINE

## 2024-04-29 RX ORDER — LEVOTHYROXINE SODIUM 137 MCG
TABLET ORAL
Qty: 96 TABLET | Refills: 3 | Status: SHIPPED | OUTPATIENT
Start: 2024-04-29

## 2024-04-29 RX ORDER — MULTIVITAMIN
1 CAPSULE ORAL DAILY
COMMUNITY

## 2024-04-29 NOTE — PROGRESS NOTES
"Chief Complaint   Patient presents with    Hypothyroidism    Thyroid Cancer      Referring Provider  Kristy Lew Md  [address] [facility]  [address]  [place] [address]     History of Present Illness:   Tara Hoang is a 58 y.o. female with thyroid nodule seen in consultation at the request of Dr. Kristy lew.  She was formerly seeing the [facility] (Dr. Yohana Deluna)   These records are reviewed.     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 No issues since then  Most recent labs were Jan 2024 - TSH 1.4, TG <0.1, Abs <1 (see care everywhere)    Current thyroid replacement is branded Synthroid 137 mcg oral tablet, take 1 tablet daily mon-sat, 1.5 tablets on Sunday. She has used levothyroxine but did not feel well on generics.  No evidence of disease based on TG and abs. She has one more ultrasound planned for [facility] and then will be done.    She has a healthy diet and exercises regularly. She has high protein and low carb diet. She has a MVI that purchased from her health food store. Her activity is [address] sterps daily and lifts weights 4x per week.     Ms Hoang is very conscientious about her health. She is concerned that she needs to get \"vitamins and mineral\" levels checked. She also asks about checking \"a  hormone screen,\" including estrogen and progesterone. Of note, her LMP was about 2 years ago.       Patient Active Problem List   Diagnosis    Acute esophagitis    Anemia    Displacement of intervertebral disc without myelopathy    Hyperlipidemia    Postoperative hypothyroidism    History of malignant neoplasm of thyroid    Bilateral carotid artery stenosis    Pure hypercholesterolemia " "   Pelvic pain    Uterine prolapse    Menopausal symptom      Past Medical History:   Diagnosis Date    Cancer (HCC)     thyroid    Disease of thyroid gland     Urinary tract infection       Past Surgical History:   Procedure Laterality Date    COLONOSCOPY  2016    FL INJECTION RIGHT HIP (ARTHROGRAM)  2018    SALPINGECTOMY Right     one tube removed    THYROIDECTOMY      WISDOM TOOTH EXTRACTION        Family History   Problem Relation Age of Onset    No Known Problems Mother     No Known Problems Father     Diabetes Brother     Colon cancer Paternal Grandfather         age 80;  age 96     Social History     Tobacco Use    Smoking status: Never     Passive exposure: Past    Smokeless tobacco: Never   Substance Use Topics    Alcohol use: Yes     Alcohol/week: 3.0 standard drinks of alcohol     Types: 3 Glasses of wine per week     Comment: occasional     Allergies   Allergen Reactions    Oxycodone GI Intolerance     (pain medications in general)         Current Outpatient Medications:     Multiple Vitamin (multivitamin) capsule, Take 1 capsule by mouth daily, Disp: , Rfl:     Synthroid 137 MCG tablet, One pill by mouth Mon-Sat and 1.5 pills on , Disp: 96 tablet, Rfl: 3  Review of Systems   Constitutional:  Negative for unexpected weight change.   HENT:  Negative for hearing loss, trouble swallowing and voice change.    Eyes:  Negative for visual disturbance.   Respiratory:  Negative for shortness of breath.    Cardiovascular:  Negative for palpitations.   Genitourinary:  Negative for menstrual problem.   Musculoskeletal:  Negative for gait problem.   Neurological:  Negative for tremors and weakness.   Psychiatric/Behavioral:  The patient is not nervous/anxious.        Physical Exam:  Body mass index is 23.42 kg/m².  /92   Pulse 73   Ht 5' 3\" (1.6 m)   Wt 60 kg (132 lb 3.2 oz)   LMP 2021 (Within Months)   BMI 23.42 kg/m²    Wt Readings from Last 3 Encounters:   24 60 kg (132 lb 3.2 " oz)   03/14/24 60.3 kg (133 lb)   01/09/23 59.9 kg (132 lb)       GEN: NAD  E/n/m nl facies, hearing intact bilat, tongue midline, lips nl  Eyes: no stare or proptosis, nl lids, EOMI  Neck: trachea midline, well healed necklace incision, no cervical LAD  CV; heart reg rate s1s2 nl, no m/r/g appreciated  Resp: CTAB, good effort  Ab+BS  Neuro: no tremor, 2+ DTRs in BUE  MS: no c/c in digits, moves all 4 ext, nl muscle bulk, gait nl  Skin: warm and dry, no palmar erythema  Psych: nl mood and affect, no gross lapses in memory    DATA:  Labs:       Lab Results   Component Value Date    TSH 3.560 11/29/2018    FREET4 2.75 (H) 11/29/2018         Radiology    Pathology        Impression:  1. History of malignant neoplasm of thyroid    2. Vitamin D deficiency    3. Postoperative hypothyroidism           Plan:    Taar was seen today for hypothyroidism and thyroid cancer.    Diagnoses and all orders for this visit:    History of malignant neoplasm of thyroid  -     Synthroid 137 MCG tablet; One pill by mouth Mon-Sat and 1.5 pills on Sunday    Vitamin D deficiency  -     Vitamin D 25 hydroxy; Future    Postoperative hypothyroidism  -     Synthroid 137 MCG tablet; One pill by mouth Mon-Sat and 1.5 pills on Sunday        Thyroid Cancer, FvPTC: Stage 1, T2,NxM0. No evidence of disease. Will plan checking TG  every 1-2 years, but no need for imaging after this last upcoming ultrasound    Post-op hypothyroidism: Will refill Synthroid 137 Mon-sat to 1.5. Currently euthyroid  Regarding lab requests- reviewed her diet and MVI intake. She has an excellent diet and I do not recommend testing anything specifically. If she Is due for a CMP from her PCP, then calcium sodium, potassium etc.. would be included/ Same with the estrogen or progesterone, in the post-menopausal states these will be low. She is post-menopausal and concerned about her vit D leve. I have ordered this for her in case she needs additional supplementation.        Discussed with the patient and all questioned fully answered. She will call me if any problems arise.        Nazia Elias MD

## 2024-06-06 ENCOUNTER — RA CDI HCC (OUTPATIENT)
Dept: OTHER | Facility: HOSPITAL | Age: 59
End: 2024-06-06

## 2024-06-06 NOTE — PROGRESS NOTES
HCC coding opportunities       Chart reviewed, no opportunity found: CHART REVIEWED, NO OPPORTUNITY FOUND        Patients Insurance        Commercial Insurance: [employer]

## 2024-06-13 ENCOUNTER — OFFICE VISIT (OUTPATIENT)
Dept: FAMILY MEDICINE CLINIC | Facility: CLINIC | Age: 59
End: 2024-06-13
Payer: COMMERCIAL

## 2024-06-13 VITALS
HEART RATE: 78 BPM | WEIGHT: 130.38 LBS | BODY MASS INDEX: 23.1 KG/M2 | SYSTOLIC BLOOD PRESSURE: 118 MMHG | HEIGHT: 63 IN | TEMPERATURE: 97.8 F | DIASTOLIC BLOOD PRESSURE: 76 MMHG | RESPIRATION RATE: 16 BRPM

## 2024-06-13 DIAGNOSIS — C73 MALIGNANT NEOPLASM OF THYROID GLAND (HCC): ICD-10-CM

## 2024-06-13 DIAGNOSIS — Z00.00 WELL ADULT EXAM: Primary | ICD-10-CM

## 2024-06-13 DIAGNOSIS — Z13.6 SCREENING FOR CARDIOVASCULAR CONDITION: ICD-10-CM

## 2024-06-13 PROCEDURE — 99396 PREV VISIT EST AGE 40-64: CPT | Performed by: INTERNAL MEDICINE

## 2024-06-13 NOTE — PROGRESS NOTES
FAMILY PRACTICE HEALTH MAINTENANCE OFFICE VISIT  [facility]    NAME: Tara Hoang  AGE: 59 y.o. SEX: female  : 1965     DATE: 2024    Assessment and Plan     1. Well adult exam  2. Malignant neoplasm of thyroid gland (HCC)  3. Screening for cardiovascular condition  -     CBC; Future  -     Comprehensive metabolic panel; Future  -     Lipid panel; Future  -     CBC  -     Comprehensive metabolic panel  -     Lipid panel      Patient Counseling:   Nutrition: Stressed importance of a well balanced diet, moderation of sodium/saturated fat, caloric balance and sufficient intake of fiber  Exercise: Stressed the importance of regular exercise with a goal of 150 minutes per week  Dental Health: Discussed daily flossing and brushing and regular dental visits     Immunizations reviewed: Risks and Benefits discussed and Declined recommended vaccinations  Discussed benefits of:  Colon Cancer Screening, Mammogram , Cervical Cancer screening, and Screening labs.  BMI Counseling: Body mass index is 23.09 kg/m². Discussed with patient's BMI with her. The BMI is normal.    Return in about 1 year (around 2025) for Annual physical.        Chief Complaint     Chief Complaint   Patient presents with   • Annual Exam     Lw cma       History of Present Illness     Here for CPE.  She does not want mammo, wants to investigate thermography.  Declines vaccines as well.         Well Adult Physical   Patient here for a comprehensive physical exam.      Diet and Physical Activity  Diet: well balanced diet  Exercise: daily      Depression Screen  PHQ-2/9 Depression Screening    Little interest or pleasure in doing things: 0 - not at all  Feeling down, depressed, or hopeless: 0 - not at all  PHQ-2 Score: 0  PHQ-2 Interpretation: Negative depression screen          General Health  Hearing: Normal:  bilateral  Vision: no vision problems  Dental: regular dental visits    Reproductive  Health  No issues  and Follows with gynecologist      The following portions of the patient's history were reviewed and updated as appropriate: allergies, current medications, past family history, past medical history, past social history, past surgical history and problem list.    Review of Systems     Review of Systems   Constitutional:  Negative for chills and fever.   HENT:  Negative for ear pain and sore throat.    Eyes:  Negative for pain and visual disturbance.   Respiratory:  Negative for cough and shortness of breath.    Cardiovascular:  Negative for chest pain and palpitations.   Gastrointestinal:  Negative for abdominal pain and vomiting.   Genitourinary:  Negative for dysuria and hematuria.   Musculoskeletal:  Negative for arthralgias and back pain.   Skin:  Negative for color change and rash.   Neurological:  Negative for seizures and syncope.   All other systems reviewed and are negative.      Past Medical History     Past Medical History:   Diagnosis Date   • Cancer (HCC)     thyroid   • Disease of thyroid gland    • Urinary tract infection        Past Surgical History     Past Surgical History:   Procedure Laterality Date   • COLONOSCOPY  2016   • FL INJECTION RIGHT HIP (ARTHROGRAM)  9/18/2018   • SALPINGECTOMY Right     one tube removed   • THYROIDECTOMY     • WISDOM TOOTH EXTRACTION         Social History     Social History     Socioeconomic History   • Marital status: /Civil Union     Spouse name: None   • Number of children: None   • Years of education: None   • Highest education level: None   Occupational History   • None   Tobacco Use   • Smoking status: Never     Passive exposure: Past   • Smokeless tobacco: Never   Vaping Use   • Vaping status: Never Used   Substance and Sexual Activity   • Alcohol use: Yes     Alcohol/week: 3.0 standard drinks of alcohol     Types: 3 Glasses of wine per week     Comment: occasional   • Drug use: No   • Sexual activity: Yes     Partners: Male   Other  "Topics Concern   • None   Social History Narrative   • None     Social Determinants of Health     Financial Resource Strain: Not on file   Food Insecurity: Not on file   Transportation Needs: Not on file   Physical Activity: Not on file   Stress: Not on file   Social Connections: Not on file   Intimate Partner Violence: Not on file   Housing Stability: Not on file       Family History     Family History   Problem Relation Age of Onset   • No Known Problems Mother    • No Known Problems Father    • Diabetes Brother    • Colon cancer Paternal Grandfather         age 80;  age 96       Current Medications       Current Outpatient Medications:   •  Multiple Vitamin (multivitamin) capsule, Take 1 capsule by mouth daily, Disp: , Rfl:   •  Synthroid 137 MCG tablet, One pill by mouth Mon-Sat and 1.5 pills on , Disp: 96 tablet, Rfl: 3     Allergies     Allergies   Allergen Reactions   • Oxycodone GI Intolerance     (pain medications in general)       Objective     /76   Pulse 78   Temp 97.8 °F (36.6 °C) (Temporal)   Resp 16   Ht 5' 3\" (1.6 m)   Wt 59.1 kg (130 lb 6 oz)   LMP 2021 (Within Months)   BMI 23.09 kg/m²      Physical Exam  Constitutional:       General: She is not in acute distress.     Appearance: She is well-developed. She is not diaphoretic.   HENT:      Head: Normocephalic and atraumatic.      Right Ear: External ear normal.      Left Ear: External ear normal.      Mouth/Throat:      Mouth: Oropharynx is clear and moist.   Eyes:      Extraocular Movements: EOM normal.   Neck:      Thyroid: No thyromegaly.   Cardiovascular:      Rate and Rhythm: Normal rate and regular rhythm.      Heart sounds: Normal heart sounds. No murmur heard.     No friction rub. No gallop.   Pulmonary:      Effort: Pulmonary effort is normal.      Breath sounds: Normal breath sounds. No wheezing or rales.   Abdominal:      General: Bowel sounds are normal.      Palpations: Abdomen is soft. There is no mass.    "   Tenderness: There is no abdominal tenderness. There is no rebound.   Musculoskeletal:         General: No deformity. Normal range of motion.      Cervical back: Normal range of motion and neck supple.   Lymphadenopathy:      Cervical: No cervical adenopathy.   Skin:     General: Skin is warm and dry.      Findings: No rash.   Neurological:      Mental Status: She is alert and oriented to person, place, and time.      Cranial Nerves: No cranial nerve deficit.      Motor: No abnormal muscle tone.      Coordination: Coordination normal.      Deep Tendon Reflexes: Reflexes are normal and symmetric. Reflexes normal.   Psychiatric:         Mood and Affect: Mood and affect normal.         Behavior: Behavior normal.         Thought Content: Thought content normal.         Judgment: Judgment normal.           Vision Screening    Right eye Left eye Both eyes   Without correction 20/50 20/30 20/25   With correction              Kristy Lew MD  [facility]